# Patient Record
Sex: FEMALE | Race: WHITE | NOT HISPANIC OR LATINO | Employment: FULL TIME | URBAN - METROPOLITAN AREA
[De-identification: names, ages, dates, MRNs, and addresses within clinical notes are randomized per-mention and may not be internally consistent; named-entity substitution may affect disease eponyms.]

---

## 2018-02-01 ENCOUNTER — HOSPITAL ENCOUNTER (EMERGENCY)
Facility: HOSPITAL | Age: 63
Discharge: HOME | End: 2018-02-01
Payer: COMMERCIAL

## 2018-02-01 VITALS
RESPIRATION RATE: 20 BRPM | DIASTOLIC BLOOD PRESSURE: 86 MMHG | SYSTOLIC BLOOD PRESSURE: 129 MMHG | HEIGHT: 64 IN | BODY MASS INDEX: 28.17 KG/M2 | TEMPERATURE: 98 F | WEIGHT: 165 LBS | OXYGEN SATURATION: 96 % | HEART RATE: 99 BPM

## 2018-02-01 DIAGNOSIS — I83.891 BLEEDING FROM VARICOSE VEINS OF LOWER EXTREMITY, RIGHT: Primary | ICD-10-CM

## 2018-02-01 LAB
BASOPHILS # BLD AUTO: 0.03 K/UL
BASOPHILS NFR BLD: 0.4 %
DIFFERENTIAL METHOD: ABNORMAL
EOSINOPHIL # BLD AUTO: 0.1 K/UL
EOSINOPHIL NFR BLD: 1.7 %
ERYTHROCYTE [DISTWIDTH] IN BLOOD BY AUTOMATED COUNT: 13.6 %
HCT VFR BLD AUTO: 42 %
HGB BLD-MCNC: 13.3 G/DL
LYMPHOCYTES # BLD AUTO: 2.5 K/UL
LYMPHOCYTES NFR BLD: 32.4 %
MCH RBC QN AUTO: 29.2 PG
MCHC RBC AUTO-ENTMCNC: 31.7 G/DL
MCV RBC AUTO: 92 FL
MONOCYTES # BLD AUTO: 0.7 K/UL
MONOCYTES NFR BLD: 9.1 %
NEUTROPHILS # BLD AUTO: 4.3 K/UL
NEUTROPHILS NFR BLD: 56.1 %
PLATELET # BLD AUTO: 285 K/UL
PMV BLD AUTO: 10.7 FL
RBC # BLD AUTO: 4.56 M/UL
WBC # BLD AUTO: 7.57 K/UL

## 2018-02-01 PROCEDURE — 99283 EMERGENCY DEPT VISIT LOW MDM: CPT

## 2018-02-01 PROCEDURE — 85025 COMPLETE CBC W/AUTO DIFF WBC: CPT

## 2018-02-01 RX ORDER — ESOMEPRAZOLE MAGNESIUM 10 MG/1
10 GRANULE, FOR SUSPENSION, EXTENDED RELEASE ORAL
COMMUNITY
End: 2022-09-01

## 2018-02-01 NOTE — ED PROVIDER NOTES
"Encounter Date: 2/1/2018       History     Chief Complaint   Patient presents with    Laceration     RLE near calf - pt states "I shaved last night and I cut myself and it just started bleeding. I think I hit a vein." No active bleeding noted at this time. Dried blood noted to pts pressure dressing."     Patient is a 62-year-old female presents emergent department for bleeding of her varicose vein in her right lower extremity.  Patient reports that she no care varicose vein shaving last night and states that she had a moderate amount of bleeding that have.  Patient reports that she applied dressing and states that every time she would remove the dressing which started bleeding shortly afterwards.  Patient reports that she had no bleeding today, but states that she was walking around and arms in no and began bleeding again from the right lower extremity near the area worsening varicose vein.  Patient reports moderate amount of blood loss again today.  Patient reports no symptoms, no chest pain or shortness of breath or lightheadedness.  Patient is not diaphoretic.  Patient is warned about her blood counts, states she is normally sometimes anemic.  Patient has no active bleeding noted.          Review of patient's allergies indicates:  Allergies not on file  Past Medical History:   Diagnosis Date    Hydrocephalus      Past Surgical History:   Procedure Laterality Date    BRAIN SURGERY       No family history on file.  Social History   Substance Use Topics    Smoking status: Never Smoker    Smokeless tobacco: Not on file    Alcohol use No     Review of Systems   Constitutional: Negative for fever.   HENT: Negative for sore throat.    Respiratory: Negative for shortness of breath.    Cardiovascular: Negative for chest pain.   Gastrointestinal: Negative for nausea.   Genitourinary: Negative for dysuria.   Musculoskeletal: Negative for back pain.   Skin: Negative for rash.        See above.    Neurological: Negative " for weakness.   Hematological: Does not bruise/bleed easily.   All other systems reviewed and are negative.      Physical Exam     Initial Vitals [02/01/18 1538]   BP Pulse Resp Temp SpO2   129/86 110 18 98.2 °F (36.8 °C) 98 %      MAP       100.33         Physical Exam    Nursing note and vitals reviewed.  Constitutional: She appears well-developed and well-nourished.   HENT:   Head: Normocephalic and atraumatic.   Nose: Nose normal.   Eyes: Conjunctivae and EOM are normal. Pupils are equal, round, and reactive to light.   Neck: Normal range of motion.   Cardiovascular: Normal rate and regular rhythm.   Pulmonary/Chest: Breath sounds normal.   Musculoskeletal: Normal range of motion.   Patient does have mild varicose veins noted bilaterally lower extremities.  Mild 1+ nonpitting edema.   Neurological: She is alert and oriented to person, place, and time.   Skin: Skin is warm and dry.   Varicose veins noted in the bilateral lower extremity is in no active bleeding noted.   Psychiatric: She has a normal mood and affect. Her behavior is normal. Judgment and thought content normal.         ED Course   Procedures  Labs Reviewed - No data to display          Medical Decision Making:   Initial Assessment:   Patient is a 62-year-old female presents emergent department for bleeding of her varicose vein in her right lower extremity.  Patient reports that she no care varicose vein shaving last night and states that she had a moderate amount of bleeding that have.  Patient reports that she applied dressing and states that every time she would remove the dressing which started bleeding shortly afterwards.  Patient reports that she had no bleeding today, but states that she was walking around and arms in no and began bleeding again from the right lower extremity near the area worsening varicose vein.  Patient reports moderate amount of blood loss again today.  Patient reports no symptoms, no chest pain or shortness of breath or  lightheadedness.  Patient is not diaphoretic.  Patient is warned about her blood counts, states she is normally sometimes anemic.  Patient has no active bleeding noted.  Differential Diagnosis:   Bleeding varicose vein  ED Management:  Patient is a 62-year-old female presented to the emergency department for blood loss secondary to varicose vein bleeding.  Patient reports every time she removed the dressing from her varicose vein and would begin to bleed.  I do believe patient most likely pulling the clot away.  Discussed with her to apply antibiotic ointment as a barrier as well as use a nonstick dressing and used is an wrap whenever she gets bleeding.  Ace is no active bleeding noted today, no site that can be cauterized.  Discussed with patient that she needs to wear compression hose as well as a nonstick pressure dressing for the next several days and follow-up with her primary care physician for further evaluation.  We'll check a CBC today because patient is reporting 2 bouts of moderate blood loss.    Patient's CBC is within normal limits, she will be discharged home.  She still has no active bleeding noted.                   ED Course      Clinical Impression:   The encounter diagnosis was Bleeding from varicose veins of lower extremity, right.    Disposition:   Disposition: Discharged  Condition: Stable                        Alan Mahoney PA-C  02/01/18 1638       Zia Tripathi MD  02/06/18 3061

## 2018-02-01 NOTE — ED TRIAGE NOTES
"RLE near calf - pt states "I shaved last night and I cut myself and it just started bleeding. I think I hit a vein." No active bleeding noted at this time. Dried blood noted to pts pressure dressing."  "

## 2018-07-10 DIAGNOSIS — Z12.31 VISIT FOR SCREENING MAMMOGRAM: Primary | ICD-10-CM

## 2018-07-13 ENCOUNTER — HOSPITAL ENCOUNTER (OUTPATIENT)
Dept: RADIOLOGY | Facility: HOSPITAL | Age: 63
Discharge: HOME | End: 2018-07-13
Attending: FAMILY MEDICINE
Payer: COMMERCIAL

## 2018-07-13 DIAGNOSIS — Z12.31 VISIT FOR SCREENING MAMMOGRAM: ICD-10-CM

## 2018-07-13 PROCEDURE — 77067 SCR MAMMO BI INCL CAD: CPT | Mod: TC,PO

## 2018-07-18 ENCOUNTER — TELEPHONE (OUTPATIENT)
Dept: RADIOLOGY | Facility: HOSPITAL | Age: 63
End: 2018-07-18

## 2018-07-25 ENCOUNTER — HOSPITAL ENCOUNTER (OUTPATIENT)
Dept: RADIOLOGY | Facility: HOSPITAL | Age: 63
Discharge: HOME | End: 2018-07-25
Attending: FAMILY MEDICINE
Payer: COMMERCIAL

## 2018-07-25 DIAGNOSIS — R92.8 ABNORMAL MAMMOGRAM: ICD-10-CM

## 2018-07-25 PROCEDURE — 77061 BREAST TOMOSYNTHESIS UNI: CPT | Mod: TC,PO

## 2018-07-25 PROCEDURE — 77065 DX MAMMO INCL CAD UNI: CPT | Mod: TC,PO

## 2018-07-25 PROCEDURE — 76642 ULTRASOUND BREAST LIMITED: CPT | Mod: TC,PO,RT

## 2018-07-30 ENCOUNTER — HOSPITAL ENCOUNTER (OUTPATIENT)
Dept: RADIOLOGY | Facility: HOSPITAL | Age: 63
Discharge: HOME | End: 2018-07-30
Attending: FAMILY MEDICINE
Payer: COMMERCIAL

## 2018-07-30 DIAGNOSIS — R92.8 ABNORMAL FINDING ON BREAST IMAGING: ICD-10-CM

## 2018-07-30 PROCEDURE — 77065 DX MAMMO INCL CAD UNI: CPT | Mod: TC,RT

## 2018-07-30 PROCEDURE — 19083 BX BREAST 1ST LESION US IMAG: CPT | Mod: ,,, | Performed by: RADIOLOGY

## 2018-07-30 PROCEDURE — 27201044 US BREAST BIOPSY WITH IMAGING 1ST SITE RIGHT

## 2018-07-30 PROCEDURE — 88360 TUMOR IMMUNOHISTOCHEM/MANUAL: CPT | Performed by: PATHOLOGY

## 2018-07-30 PROCEDURE — 77065 DX MAMMO INCL CAD UNI: CPT | Mod: 26,RT,, | Performed by: RADIOLOGY

## 2018-07-30 PROCEDURE — 88360 TUMOR IMMUNOHISTOCHEM/MANUAL: CPT | Mod: 26,,, | Performed by: PATHOLOGY

## 2018-07-30 PROCEDURE — 19083 BX BREAST 1ST LESION US IMAG: CPT

## 2018-07-30 PROCEDURE — 88305 TISSUE EXAM BY PATHOLOGIST: CPT | Mod: 26,,, | Performed by: PATHOLOGY

## 2018-08-03 ENCOUNTER — TELEPHONE (OUTPATIENT)
Dept: SURGERY | Facility: CLINIC | Age: 63
End: 2018-08-03

## 2018-08-03 NOTE — TELEPHONE ENCOUNTER
"Patient called me and discussed her recent diagnosis. She would like to schedule with Dr. Eden in his [place] clinic for 8/17. Offered sooner but she states that her school is starting and she would like to wait "until I get things settled"     Scheduled for 8/17 at 2:30 per patient request. Reviewed location of the [place] clinic. Patient verbalized understanding to all information   "

## 2018-08-03 NOTE — TELEPHONE ENCOUNTER
----- Message from RT Nona sent at 8/3/2018  4:01 PM CDT -----  Pt was given her positive breast biopsy results. She definitely wants to stay in the [facility] system. She will discuss her campus preference with her . She was made aware the 8/17 Dr. Eden will be in clinic in [place]. She has your phone number and will call you or if you have the opportunity to call her at your convenience that is fine also.

## 2018-08-17 ENCOUNTER — OFFICE VISIT (OUTPATIENT)
Dept: SURGERY | Facility: CLINIC | Age: 63
End: 2018-08-17
Payer: COMMERCIAL

## 2018-08-17 VITALS
HEART RATE: 56 BPM | SYSTOLIC BLOOD PRESSURE: 110 MMHG | BODY MASS INDEX: 26.98 KG/M2 | DIASTOLIC BLOOD PRESSURE: 83 MMHG | WEIGHT: 158 LBS | RESPIRATION RATE: 20 BRPM | HEIGHT: 64 IN

## 2018-08-17 DIAGNOSIS — C50.211 PRIMARY CANCER OF UPPER INNER QUADRANT OF RIGHT FEMALE BREAST: Primary | ICD-10-CM

## 2018-08-17 PROCEDURE — 99245 OFF/OP CONSLTJ NEW/EST HI 55: CPT | Mod: S$GLB,,, | Performed by: SURGERY

## 2018-08-17 PROCEDURE — 99999 PR PBB SHADOW E&M-EST. PATIENT-LVL III: CPT | Mod: PBBFAC,,, | Performed by: SURGERY

## 2018-08-17 NOTE — LETTER
August 18, 2018      Evens Moreno III, MD  [address] [place] [place] [address]           [facility]  [address]  [facility] [address]  Phone: [phone]  Fax: [phone]          Patient: Ira Novoa   MR Number: [id number]   YOB: 1955   Date of Visit: 8/17/2018       Dear Dr. Evens Moreno III:    Thank you for referring Ira Novoa to me for evaluation. Attached you will find relevant portions of my assessment and plan of care.    If you have questions, please do not hesitate to call me. I look forward to following Ira Novoa along with you.    Sincerely,    Edgar Eden MD    Enclosure  CC:  No Recipients    If you would like to receive this communication electronically, please contact [email or web address] or [phone] to request more information on [employer] Link access.    For providers and/or their staff who would like to refer a patient to [facility], please contact us through our one-stop-shop provider referral line, [facility], at [phone].    If you feel you have received this communication in error or would no longer like to receive these types of communications, please e-mail [email or web address]

## 2018-08-17 NOTE — PROGRESS NOTES
New Breast Cancer  History and Physical  [facility]  Department of Surgery    REFERRING PROVIDER: Evens Moreno III, MD  [address] [place] [address]    CHIEF COMPLAINT: right breast cancer    Subjective:      Ira Novoa is a 63 y.o. postmenopausal female referred for evaluation of recently diagnosed carcinoma of the right breast. The patient was initially referred for surgical evaluation of an abnormal mammogram first noted 2018. Follow-up imaging showed an irregularly shaped, non-parallel, hypoechoic mass with indistinct margins with shadowing seen in the upper inner quadrant of the right breast with a 5 mm oval shaped hypoechoic mass adjacent to the shadowing mass and a 6 mm oval shaped hypoechoic mass 1.3 cm from the shadowing mass.   Patient was initially evaluated for left breast pain. Reports severe constant left breast pain which causes disruption of sleep and is not relieved by pain medications. This pain prompted her to obtain a mammogram which is when the right breast mass was noted.     Patient does routinely do self breast exams.  Patient has not noted a change on breast exam.  Patient denies nipple discharge. Patient denies to previous breast biopsy. Patient denies a personal history of breast cancer.    Patient is a nonsmoker. No diabetes. Patient has history of hydrocephalus for which a  shunt was placed at age 6 weeks and has had 7 revisions since. Shunt runs from neck around left shoulder and down midline.    Findings at that time were the following:   Tumor size: 0.8 cm   Tumor ndgndrndanddndend:nd nd2nd Estrogen Receptor: +   Progesterone Receptor: -   Her-2 sri: insufficient   Lymph node status: clinically negative       GYN History:  Age of menarche was 11. Age of menopause was 56.  Patient denies hormonal therapy. Patient is . Age of first live birth was 29.     FAMILY History:  No breast or ovarian cancer    Past Medical History:   Diagnosis Date    Hydrocephalus   "    Past Surgical History:   Procedure Laterality Date    BRAIN SURGERY       Current Outpatient Medications on File Prior to Visit   Medication Sig Dispense Refill    esomeprazole magnesium (NEXIUM) 10 mg GrPS Take 10 mg by mouth before breakfast.       No current facility-administered medications on file prior to visit.      Social History     Socioeconomic History    Marital status:      Spouse name: Not on file    Number of children: Not on file    Years of education: Not on file    Highest education level: Not on file   Social Needs    Financial resource strain: Not on file    Food insecurity - worry: Not on file    Food insecurity - inability: Not on file    Transportation needs - medical: Not on file    Transportation needs - non-medical: Not on file   Occupational History    Not on file   Tobacco Use    Smoking status: Never Smoker   Substance and Sexual Activity    Alcohol use: No    Drug use: Not on file    Sexual activity: Not on file   Other Topics Concern    Not on file   Social History Narrative    Not on file     History reviewed. No pertinent family history.     Review of Systems  Review of Systems   Constitutional: Negative for chills and fever.   HENT: Negative for congestion and sore throat.    Respiratory: Negative for cough and shortness of breath.    Cardiovascular: Negative for chest pain and palpitations.   Gastrointestinal: Negative for abdominal distention and abdominal pain.   Musculoskeletal: Negative for arthralgias and myalgias.   Neurological: Negative for dizziness and headaches.   Psychiatric/Behavioral: Negative for agitation and confusion.        Objective:   PHYSICAL EXAM:  /83 (BP Location: Right arm, Patient Position: Sitting)   Pulse (!) 56   Resp 20   Ht 5' 4" (1.626 m)   Wt 71.7 kg (158 lb)   BMI 27.12 kg/m²     Physical Exam   Constitutional: She is oriented to person, place, and time. She appears well-developed and well-nourished. No " distress.   Cardiovascular: Normal rate and regular rhythm.    Pulmonary/Chest: Effort normal. Right breast exhibits mass. Right breast exhibits no inverted nipple, no nipple discharge, no skin change and no tenderness. Left breast exhibits tenderness (Generalized left breast tenderness and axillary tenderness). Left breast exhibits no inverted nipple, no mass, no nipple discharge and no skin change.       Neurological: She is alert and oriented to person, place, and time.   Skin: Skin is warm and dry.     Psychiatric: She has a normal mood and affect. Her behavior is normal.         Assessment:      Ira Novoa is a 63 y.o. postmenopausal female with recently diagnosed carcinoma of the right breast.      Plan:     Discussed surgical options with patient and her . Since right breast mass has two other hypoechoic areas 1.3cm from original mass which could be cysts or possible satellite lesions, a lumpectomy would likely leave her right breast with some minimal deformity. Patient would like right sided mastectomy as well as left prophylactic mastectomy due to her significant breast pain.   We discussed reconstruction options, both implant and tissue. Since she has many scars on her abdomen from prior shunt revisions, implant reconstruction would likely be a more viable option and give her a more cosmetic result.   Plan for her to meet with Dr. Wilde to discuss reconstruction options then will set up for bilateral mastectomy with right SLNB.    Magaly Rizo MD, PGY-3  General Surgery  [phone]    I have personally taken the history and examined this patient and agree with the resident's note as stated above.  I have personally taken the history and examined this patient and agree with the resident's note as stated above.  SUBJECTIVE:  Ira Novoa is a very pleasant 63-year-old  female   who presents with her , Dany, here at the [facility] with a   newly diagnosed right  invasive ductal carcinoma.  The patient had presented with   left breast pain, which had been increasing in severity and intensity.    Diagnostic imaging and workup revealed a contralateral right-sided invasive   ductal carcinoma that was estrogen receptor positive, progesterone receptor   negative, and HER-2/sri insufficient for diagnosis.  The patient originally had   an 8 mm mass in the right breast and two possibly adjacent hypoechoic lesions,   1.3 cm from it, which were either cysts or satellite lesions.    There is an irregularly shaped mass with spiculated margins seen in the upper inner quadrant of the right breast in the posterior depth.  There are pleomorphic calcifications associated with this mass.      US Breast Right Limited  There is an irregularly shaped, non-parallel, hypoechoic mass with indistinct margins with shadowing seen in the upper inner quadrant of the right breast. The mass correlates with a prior mammogram finding.  There is a 5 mm oval shaped hypoechoic mass adjacent to the shadowing mass.  There is a 6 mm oval shaped hypoechoic mass 1.3 cm from the shadowing mass.    The patient is comfortable with either breast conservation surgery or   mastectomy, but would prefer bilateral mastectomy with possible nipple-sparing   mastectomy if possible.  She would prefer implants.  Her significant history is   that she has a congenital  shunt, which has been in place for hydrocephalus   since she was 6 weeks of age.   shunt comes around the left side of her neck   and descends along the midline anterior chest wall into the abdominal cavity.  I   can feel the  shunt along the left neck and she states it descends down the   midline between the two breasts, which I cannot necessarily feel.  She has no   immunosuppressive issue.  She is not a smoker and she is not a diabetic.  She   does desire the bilateral mastectomies.  We will have her see Dr. Wilde for   consultation over at [facility] to  discuss implant technique reconstruction   with either direct implant versus a tissue expansion and subsequent implant   exchange.  At this point, she will be scheduled for a right nipple-sparing   mastectomy with right axillary staging sentinel lymph node biopsy and   contralateral left nipple-sparing mastectomy for prophylaxis.  She appears to be   a good candidate for a nipple-sparing mastectomy with C cup size breast with no   significant ptosis.  She is unable to get an MRI because of the  shunt and we   will await consultation with Dr. Wilde.  Again, from my standpoint, she will   have a right potential nipple sparing mastectomy with right axillary sentinel   lymph node biopsy with contralateral left prophylactic nipple-sparing mastectomy   with immediate implant technique type of reconstruction.      RLC/HN  dd: 08/17/2018 16:16:38 (CDT)  td: 08/17/2018 20:04:27 (CDT)  Doc ID   #[id number]  Job ID #[id number]    CC:     Job #

## 2018-08-17 NOTE — LETTER
[facility]  [address]  [place] [address]  Phone: [phone]  Fax: [phone] August 18, 2018      Evens Moreno III, MD  [address] [place] [address]    Patient: Ira Novoa   MR Number: [id number]   YOB: 1955   Date of Visit: 8/17/2018     Dear Dr. Moreno:    Thank you for referring Ira Novoa to me for evaluation. Attached you will find relevant portions of my assessment and plan of care.    Ira Novoa is a very pleasant 63-year-old  female who presents with her , Dany, here at the [facility] with a newly diagnosed right invasive ductal carcinoma.  The patient had presented with left breast pain, which had been increasing in severity and intensity.  Diagnostic imaging and workup revealed a contralateral right-sided invasive ductal carcinoma that was estrogen receptor positive, progesterone receptor negative, and HER-2/sri insufficient for diagnosis.  The patient originally had an 8 mm mass in the right breast and two possibly adjacent hypoechoic lesions, 1.3 cm from it, which were either cysts or satellite lesions.    There is an irregularly shaped mass with spiculated margins seen in the upper inner quadrant of the right breast in the posterior depth. There are pleomorphic calcifications associated with this mass.      US Breast Right Limited.  There is an irregularly shaped, non-parallel, hypoechoic mass with indistinct margins with shadowing seen in the upper inner quadrant of the right breast. The mass correlates with a prior mammogram finding.  There is a 5 mm oval shaped hypoechoic mass adjacent to the shadowing mass.  There is a 6 mm oval shaped hypoechoic mass 1.3 cm from the shadowing mass.    The patient is comfortable with either breast conservation surgery or mastectomy, but would prefer bilateral mastectomy with possible nipple-sparing mastectomy if possible.  She would prefer implants.   Her significant history is that she has a congenital  shunt, which has been in place for hydrocephalus since she was 6 weeks of age.   shunt comes around the left side of her neck and descends along the midline anterior chest wall into the abdominal cavity.  I can feel the  shunt along the left neck and she states it descends down the midline between the two breasts, which I cannot necessarily feel.  She has no immunosuppressive issue.  She is not a smoker and she is not a diabetic.  She does desire the bilateral mastectomies.  We will have her see Dr. Wilde for consultation over at [facility] to discuss implant technique reconstruction with either direct implant versus a tissue expansion and subsequent implant exchange.      At this point, she will be scheduled for a right nipple-sparing mastectomy with right axillary staging sentinel lymph node biopsy and contralateral left nipple-sparing mastectomy for prophylaxis.  She appears to be a good candidate for a nipple-sparing mastectomy with C cup size breast with no significant ptosis.  She is unable to get an MRI because of the  shunt and we will await consultation with Dr. Wilde.  Again, from my standpoint, she will have a right potential nipple sparing mastectomy with right axillary sentinel lymph node biopsy with contralateral left prophylactic nipple-sparing mastectomy with immediate implant technique type of reconstruction.    If you have questions, please do not hesitate to call me. I look forward to following Ira Novoa along with you.    Sincerely,    Edgar Eden MD  Medical Director, [facility]  Staff Attending Surgeon - Department of Surgery  [facility]  Associate Professor of Surgery  [employer]  [facility] [employer]    RLC/hcr

## 2018-08-18 PROBLEM — C50.211: Status: ACTIVE | Noted: 2018-08-18

## 2018-08-20 ENCOUNTER — TELEPHONE (OUTPATIENT)
Dept: PLASTIC SURGERY | Facility: CLINIC | Age: 63
End: 2018-08-20

## 2018-08-20 NOTE — TELEPHONE ENCOUNTER
Called regarding portal message request for consult appt, CITLALLI with office call back info. RITA, LPN

## 2018-08-22 ENCOUNTER — OFFICE VISIT (OUTPATIENT)
Dept: PLASTIC SURGERY | Facility: CLINIC | Age: 63
End: 2018-08-22
Payer: COMMERCIAL

## 2018-08-22 VITALS
RESPIRATION RATE: 12 BRPM | DIASTOLIC BLOOD PRESSURE: 82 MMHG | WEIGHT: 157.75 LBS | HEART RATE: 58 BPM | BODY MASS INDEX: 26.93 KG/M2 | HEIGHT: 64 IN | SYSTOLIC BLOOD PRESSURE: 129 MMHG | TEMPERATURE: 98 F

## 2018-08-22 DIAGNOSIS — C50.211 PRIMARY CANCER OF UPPER INNER QUADRANT OF RIGHT FEMALE BREAST: Primary | ICD-10-CM

## 2018-08-22 PROCEDURE — 99203 OFFICE O/P NEW LOW 30 MIN: CPT | Mod: S$GLB,,, | Performed by: SURGERY

## 2018-08-22 PROCEDURE — 99999 PR PBB SHADOW E&M-EST. PATIENT-LVL III: CPT | Mod: PBBFAC,,, | Performed by: SURGERY

## 2018-08-22 PROCEDURE — 3008F BODY MASS INDEX DOCD: CPT | Mod: CPTII,S$GLB,, | Performed by: SURGERY

## 2018-08-22 NOTE — LETTER
August 22, 2018      Evens Moreno III, MD  [address] [place] [address]           [facility]  [address]  [facility] [address]  Phone: [phone]  Fax: [phone]          Patient: Ira Novoa   MR Number: [id number]   YOB: 1955   Date of Visit: 8/22/2018       Dear Dr. Evens Moreno III:    Thank you for referring Ira Novoa to me for evaluation. Attached you will find relevant portions of my assessment and plan of care.    If you have questions, please do not hesitate to call me. I look forward to following Ira Novoa along with you.    Sincerely,    Donnie Wilde MD    Enclosure  CC:  No Recipients    If you would like to receive this communication electronically, please contact [email or web address] or [phone] to request more information on [employer] Link access.    For providers and/or their staff who would like to refer a patient to [facility], please contact us through our one-stop-shop provider referral line, [facility], at [phone].    If you feel you have received this communication in error or would no longer like to receive these types of communications, please e-mail [email or web address]

## 2018-08-22 NOTE — LETTER
August 30, 2018      Evens Moreno III, MD  [address] [place] [address]     [facility]  [address]  [facility] [address]  Phone: [phone]  Fax: [phone]   Patient: Ira Novoa   MR Number: [id number]   YOB: 1955   Date of Visit: 8/22/2018     Dear Dr. Moreno:    Thank you for referring Ira Novoa to me for evaluation. Below are the relevant portions of my assessment and plan of care.    ASSESSMENT/PLAN:      Patient is a 63-year-old femal with new DX of R invasive ductal cancer with ER+SC-, HER2-, and chronic left breast pain. Planned for bilateral nipple sparing mastectomies  -Plan for direct to implant reconstruction  -Discussed risks and alternative options, including TE, no reconstruction, autologous tissue, patient desires implant based reconstruction.   -Also discussed possibility of nipple necrosis, infection, bleeding, need for future surgeries    If you have questions, please do not hesitate to call me. I look forward to following Ira along with you.    Sincerely,      MD Donnie Carroll M.D.  Section of Plastic Surgery  [facility]  YANDEL/shari

## 2018-08-22 NOTE — PROGRESS NOTES
Juan Pablo Rosario - Plastic Surg [facility]  History & Physical  Plastic Surgery    SUBJECTIVE:     Chief Complaint: breast cancer reconstruction    History of Present Illness:  Patient is a 63 y.o. female with a new diagnosis of R breast cancer at R upper inner outer quadrant, invasive ductal cancer, found on workup of left breast pain. Bilateral nipple sparing mastectomies are planned, with R SLN biopsy. She reports being in otherwise good health, no smoking or DM. She has a hx of congenital hydrocephalus and has a  shunt in place.       (Not in a hospital admission)    Review of patient's allergies indicates:  No Known Allergies    Past Medical History:   Diagnosis Date    Hydrocephalus      Past Surgical History:   Procedure Laterality Date    BRAIN SURGERY       No family history on file.  Social History     Tobacco Use    Smoking status: Never Smoker   Substance Use Topics    Alcohol use: No    Drug use: Not on file        Review of Systems:  Constitutional: no fever or chills  Cardiovascular: no chest pain or palpitations  Gastrointestinal: no nausea or vomiting, tolerating diet  Integument/Breast: no rash or pruritis  Musculoskeletal: no arthralgias or myalgias  Neurological: no seizures or tremors  Behavioral/Psych: no auditory or visual hallucinations    OBJECTIVE:     Vital Signs (Most Recent):  Temp: 98.1 °F (36.7 °C) (08/22/18 0829)  Pulse: (!) 58 (08/22/18 0829)  Resp: 12 (08/22/18 0829)  BP: 129/82 (08/22/18 0829)    Physical Exam:  General: no distress  Eyes:  conjunctivae/corneas clear.  Lungs:  clear to auscultation bilaterally and normal respiratory effort  Chest Wall: well healed scars from  shunt  Abdomen: soft, non-tender non-distented; bowel sounds normal; no masses,  no organomegaly and well healed scars from  shunt  Extremities: no cyanosis or edema, or clubbing  Skin: Skin color, texture, turgor normal. No rashes or lesions  Neurologic: Alert and oriented. Thought content  appropriate      ASSESSMENT/PLAN:     63F w/ new dx of R invasive ductal cancer with ER+UT-, HER2-, and chronic left breast pain. Planned for bilateral nipple sparing mastectomies  -plan for direct to implant reconstruction  -discussed risks and alternative options, including TE, no reconstruction, autologous tissue, patient desires implant based reconstruction.   -also discussed possibility of nipple necrosis, infection, bleeding, need for future surgeries.

## 2018-08-27 ENCOUNTER — TELEPHONE (OUTPATIENT)
Dept: PLASTIC SURGERY | Facility: CLINIC | Age: 63
End: 2018-08-27

## 2018-08-27 NOTE — TELEPHONE ENCOUNTER
Pt called back in reference to earlier question about surgery date. Reinforced that surgery may need another provider to consult with for procedure and office will reach out as soon as dates become available. RITA, BOST[place]

## 2018-08-27 NOTE — TELEPHONE ENCOUNTER
LVM on unid'd VM in regards to call center question about surgery date. Appears that surgery may in combination with another surgeon, left office contact info on VM. BOSTON SALINAS    ----- Message from Rufino Lawrence sent at 8/27/2018 11:11 AM CDT -----  Pt would like to speak with someone regarding the scheduling of her surgery. Please call pt at [phone]

## 2018-08-29 DIAGNOSIS — C50.211 PRIMARY CANCER OF UPPER INNER QUADRANT OF RIGHT FEMALE BREAST: Primary | ICD-10-CM

## 2018-09-04 DIAGNOSIS — C50.211 PRIMARY CANCER OF UPPER INNER QUADRANT OF RIGHT FEMALE BREAST: Primary | ICD-10-CM

## 2018-09-13 ENCOUNTER — TELEPHONE (OUTPATIENT)
Dept: PLASTIC SURGERY | Facility: CLINIC | Age: 63
End: 2018-09-13

## 2018-09-13 NOTE — TELEPHONE ENCOUNTER
Pre Op 9:55 a 9..13.18    Ms Novoa was pre-op'd for TE.  I verified her surgery date and to report to 2nd floor [facility].  I educated her on preparations prior to, during at post operatively.  I explained hygiene, use and function of the ELENI drain as well as when to expect a call regarding arrival time.      She verbalized understanding and will call office should any questions or concerns arise. Ms Novoa had brain   surgery years ago which resulted in blood clotting of her lungs. I promised that that would be documented.

## 2018-09-20 ENCOUNTER — ANESTHESIA EVENT (OUTPATIENT)
Dept: SURGERY | Facility: HOSPITAL | Age: 63
End: 2018-09-20
Payer: COMMERCIAL

## 2018-09-21 ENCOUNTER — TELEPHONE (OUTPATIENT)
Dept: SURGERY | Facility: CLINIC | Age: 63
End: 2018-09-21

## 2018-09-21 NOTE — TELEPHONE ENCOUNTER
----- Message from Rufino Lawrence sent at 9/21/2018 12:36 PM CDT -----  Pt is calling in regards to her surgery time for Monday. Please call pt at [phone]

## 2018-09-21 NOTE — TELEPHONE ENCOUNTER
Instructed to shower twice with antibacterial soap (pm and am), no food after midnight but okay to have clear liquids 11am.

## 2018-09-24 ENCOUNTER — ANESTHESIA (OUTPATIENT)
Dept: SURGERY | Facility: HOSPITAL | Age: 63
End: 2018-09-24
Payer: COMMERCIAL

## 2018-09-24 ENCOUNTER — HOSPITAL ENCOUNTER (OUTPATIENT)
Dept: RADIOLOGY | Facility: HOSPITAL | Age: 63
Discharge: HOME | End: 2018-09-24
Attending: SURGERY | Admitting: SURGERY
Payer: COMMERCIAL

## 2018-09-24 ENCOUNTER — HOSPITAL ENCOUNTER (OUTPATIENT)
Facility: HOSPITAL | Age: 63
Discharge: HOME | End: 2018-09-25
Attending: SURGERY | Admitting: SURGERY
Payer: COMMERCIAL

## 2018-09-24 DIAGNOSIS — C50.919 BREAST CANCER: Primary | ICD-10-CM

## 2018-09-24 DIAGNOSIS — C50.211 PRIMARY CANCER OF UPPER INNER QUADRANT OF RIGHT FEMALE BREAST: ICD-10-CM

## 2018-09-24 PROCEDURE — A9520 TC99 TILMANOCEPT DIAG 0.5MCI: HCPCS

## 2018-09-24 PROCEDURE — 71000033 HC RECOVERY, INTIAL HOUR: Performed by: SURGERY

## 2018-09-24 PROCEDURE — 15777 ACELLULAR DERM MATRIX IMPLT: CPT | Mod: 50,,, | Performed by: SURGERY

## 2018-09-24 PROCEDURE — S0028 INJECTION, FAMOTIDINE, 20 MG: HCPCS | Performed by: NURSE ANESTHETIST, CERTIFIED REGISTERED

## 2018-09-24 PROCEDURE — 36000706: Performed by: SURGERY

## 2018-09-24 PROCEDURE — 64520 N BLOCK LUMBAR/THORACIC: CPT | Performed by: ANESTHESIOLOGY

## 2018-09-24 PROCEDURE — D9220A PRA ANESTHESIA: Mod: CRNA,,, | Performed by: NURSE ANESTHETIST, CERTIFIED REGISTERED

## 2018-09-24 PROCEDURE — 88305 TISSUE EXAM BY PATHOLOGIST: CPT | Performed by: PATHOLOGY

## 2018-09-24 PROCEDURE — 37000009 HC ANESTHESIA EA ADD 15 MINS: Performed by: SURGERY

## 2018-09-24 PROCEDURE — D9220A PRA ANESTHESIA: Mod: ANES,,, | Performed by: ANESTHESIOLOGY

## 2018-09-24 PROCEDURE — 25000003 PHARM REV CODE 250: Performed by: SURGERY

## 2018-09-24 PROCEDURE — 88331 PATH CONSLTJ SURG 1 BLK 1SPC: CPT | Mod: 26,,, | Performed by: PATHOLOGY

## 2018-09-24 PROCEDURE — 63600175 PHARM REV CODE 636 W HCPCS: Performed by: NURSE ANESTHETIST, CERTIFIED REGISTERED

## 2018-09-24 PROCEDURE — C1729 CATH, DRAINAGE: HCPCS | Performed by: SURGERY

## 2018-09-24 PROCEDURE — 63600175 PHARM REV CODE 636 W HCPCS: Performed by: SURGERY

## 2018-09-24 PROCEDURE — 88360 TUMOR IMMUNOHISTOCHEM/MANUAL: CPT | Mod: 26,,, | Performed by: PATHOLOGY

## 2018-09-24 PROCEDURE — 63600175 PHARM REV CODE 636 W HCPCS: Performed by: ANESTHESIOLOGY

## 2018-09-24 PROCEDURE — 36000707: Performed by: SURGERY

## 2018-09-24 PROCEDURE — 27201423 OPTIME MED/SURG SUP & DEVICES STERILE SUPPLY: Performed by: SURGERY

## 2018-09-24 PROCEDURE — 88341 IMHCHEM/IMCYTCHM EA ADD ANTB: CPT | Mod: 26,59,, | Performed by: PATHOLOGY

## 2018-09-24 PROCEDURE — 25000003 PHARM REV CODE 250: Performed by: NURSE ANESTHETIST, CERTIFIED REGISTERED

## 2018-09-24 PROCEDURE — 38900 IO MAP OF SENT LYMPH NODE: CPT | Mod: RT,,, | Performed by: SURGERY

## 2018-09-24 PROCEDURE — 71000039 HC RECOVERY, EACH ADD'L HOUR: Performed by: SURGERY

## 2018-09-24 PROCEDURE — 25000003 PHARM REV CODE 250: Performed by: STUDENT IN AN ORGANIZED HEALTH CARE EDUCATION/TRAINING PROGRAM

## 2018-09-24 PROCEDURE — C1789 PROSTHESIS, BREAST, IMP: HCPCS | Performed by: SURGERY

## 2018-09-24 PROCEDURE — 37000008 HC ANESTHESIA 1ST 15 MINUTES: Performed by: SURGERY

## 2018-09-24 PROCEDURE — 38525 BIOPSY/REMOVAL LYMPH NODES: CPT | Mod: 51,RT,, | Performed by: SURGERY

## 2018-09-24 PROCEDURE — 88342 IMHCHEM/IMCYTCHM 1ST ANTB: CPT | Mod: 26,59,, | Performed by: PATHOLOGY

## 2018-09-24 PROCEDURE — 12000002 HC ACUTE/MED SURGE SEMI-PRIVATE ROOM

## 2018-09-24 PROCEDURE — 19340 INSJ BREAST IMPLT SM D MAST: CPT | Mod: 50,,, | Performed by: SURGERY

## 2018-09-24 PROCEDURE — 64461 PVB THORACIC SINGLE INJ SITE: CPT | Mod: 50,59,, | Performed by: ANESTHESIOLOGY

## 2018-09-24 PROCEDURE — 88307 TISSUE EXAM BY PATHOLOGIST: CPT | Mod: 26,,, | Performed by: PATHOLOGY

## 2018-09-24 PROCEDURE — 63600175 PHARM REV CODE 636 W HCPCS: Mod: JG | Performed by: SURGERY

## 2018-09-24 PROCEDURE — S0077 INJECTION, CLINDAMYCIN PHOSP: HCPCS | Performed by: NURSE ANESTHETIST, CERTIFIED REGISTERED

## 2018-09-24 PROCEDURE — 19303 MAST SIMPLE COMPLETE: CPT | Mod: 50,,, | Performed by: SURGERY

## 2018-09-24 DEVICE — IMPLANTABLE DEVICE: Type: IMPLANTABLE DEVICE | Site: BREAST | Status: FUNCTIONAL

## 2018-09-24 RX ORDER — ROCURONIUM BROMIDE 10 MG/ML
INJECTION, SOLUTION INTRAVENOUS
Status: DISCONTINUED | OUTPATIENT
Start: 2018-09-24 | End: 2018-09-24

## 2018-09-24 RX ORDER — GLYCOPYRROLATE 0.2 MG/ML
INJECTION INTRAMUSCULAR; INTRAVENOUS
Status: DISCONTINUED | OUTPATIENT
Start: 2018-09-24 | End: 2018-09-24

## 2018-09-24 RX ORDER — HYDROMORPHONE HYDROCHLORIDE 1 MG/ML
0.5 INJECTION, SOLUTION INTRAMUSCULAR; INTRAVENOUS; SUBCUTANEOUS EVERY 4 HOURS PRN
Status: DISCONTINUED | OUTPATIENT
Start: 2018-09-24 | End: 2018-09-25 | Stop reason: HOSPADM

## 2018-09-24 RX ORDER — LIDOCAINE HCL/PF 100 MG/5ML
SYRINGE (ML) INTRAVENOUS
Status: DISCONTINUED | OUTPATIENT
Start: 2018-09-24 | End: 2018-09-24

## 2018-09-24 RX ORDER — PROPOFOL 10 MG/ML
VIAL (ML) INTRAVENOUS
Status: DISCONTINUED | OUTPATIENT
Start: 2018-09-24 | End: 2018-09-24

## 2018-09-24 RX ORDER — SODIUM CHLORIDE 0.9 % (FLUSH) 0.9 %
3 SYRINGE (ML) INJECTION
Status: DISCONTINUED | OUTPATIENT
Start: 2018-09-24 | End: 2018-09-24 | Stop reason: HOSPADM

## 2018-09-24 RX ORDER — ISOSULFAN BLUE 50 MG/5ML
INJECTION, SOLUTION SUBCUTANEOUS
Status: DISCONTINUED | OUTPATIENT
Start: 2018-09-24 | End: 2018-09-24 | Stop reason: HOSPADM

## 2018-09-24 RX ORDER — CYCLOBENZAPRINE HCL 10 MG
10 TABLET ORAL 3 TIMES DAILY
Status: DISCONTINUED | OUTPATIENT
Start: 2018-09-24 | End: 2018-09-25 | Stop reason: HOSPADM

## 2018-09-24 RX ORDER — OXYCODONE AND ACETAMINOPHEN 5; 325 MG/1; MG/1
1 TABLET ORAL EVERY 4 HOURS PRN
Status: DISCONTINUED | OUTPATIENT
Start: 2018-09-24 | End: 2018-09-25 | Stop reason: HOSPADM

## 2018-09-24 RX ORDER — SODIUM CHLORIDE 9 MG/ML
INJECTION, SOLUTION INTRAVENOUS CONTINUOUS
Status: DISCONTINUED | OUTPATIENT
Start: 2018-09-24 | End: 2018-09-24

## 2018-09-24 RX ORDER — ONDANSETRON 2 MG/ML
INJECTION INTRAMUSCULAR; INTRAVENOUS
Status: DISCONTINUED | OUTPATIENT
Start: 2018-09-24 | End: 2018-09-24

## 2018-09-24 RX ORDER — NEOSTIGMINE METHYLSULFATE 1 MG/ML
INJECTION, SOLUTION INTRAVENOUS
Status: DISCONTINUED | OUTPATIENT
Start: 2018-09-24 | End: 2018-09-24

## 2018-09-24 RX ORDER — CLINDAMYCIN PHOSPHATE 900 MG/50ML
INJECTION, SOLUTION INTRAVENOUS
Status: DISCONTINUED | OUTPATIENT
Start: 2018-09-24 | End: 2018-09-24

## 2018-09-24 RX ORDER — MIDAZOLAM HYDROCHLORIDE 1 MG/ML
0.5 INJECTION INTRAMUSCULAR; INTRAVENOUS
Status: DISCONTINUED | OUTPATIENT
Start: 2018-09-24 | End: 2018-09-24

## 2018-09-24 RX ORDER — PANTOPRAZOLE SODIUM 40 MG/1
40 TABLET, DELAYED RELEASE ORAL DAILY
Status: DISCONTINUED | OUTPATIENT
Start: 2018-09-25 | End: 2018-09-25 | Stop reason: HOSPADM

## 2018-09-24 RX ORDER — OXYCODONE AND ACETAMINOPHEN 5; 325 MG/1; MG/1
2 TABLET ORAL EVERY 4 HOURS PRN
Status: DISCONTINUED | OUTPATIENT
Start: 2018-09-24 | End: 2018-09-25 | Stop reason: HOSPADM

## 2018-09-24 RX ORDER — ONDANSETRON 2 MG/ML
4 INJECTION INTRAMUSCULAR; INTRAVENOUS EVERY 12 HOURS PRN
Status: DISCONTINUED | OUTPATIENT
Start: 2018-09-24 | End: 2018-09-25 | Stop reason: HOSPADM

## 2018-09-24 RX ORDER — DEXAMETHASONE SODIUM PHOSPHATE 4 MG/ML
INJECTION, SOLUTION INTRA-ARTICULAR; INTRALESIONAL; INTRAMUSCULAR; INTRAVENOUS; SOFT TISSUE
Status: DISCONTINUED | OUTPATIENT
Start: 2018-09-24 | End: 2018-09-24

## 2018-09-24 RX ORDER — ROPIVACAINE HYDROCHLORIDE 5 MG/ML
INJECTION, SOLUTION EPIDURAL; INFILTRATION; PERINEURAL
Status: COMPLETED | OUTPATIENT
Start: 2018-09-24 | End: 2018-09-24

## 2018-09-24 RX ORDER — FENTANYL CITRATE 50 UG/ML
25 INJECTION, SOLUTION INTRAMUSCULAR; INTRAVENOUS EVERY 5 MIN PRN
Status: COMPLETED | OUTPATIENT
Start: 2018-09-24 | End: 2018-09-24

## 2018-09-24 RX ORDER — CLINDAMYCIN HYDROCHLORIDE 150 MG/1
300 CAPSULE ORAL EVERY 8 HOURS
Status: DISCONTINUED | OUTPATIENT
Start: 2018-09-24 | End: 2018-09-25 | Stop reason: HOSPADM

## 2018-09-24 RX ORDER — KETAMINE HYDROCHLORIDE 10 MG/ML
INJECTION, SOLUTION INTRAMUSCULAR; INTRAVENOUS
Status: DISCONTINUED | OUTPATIENT
Start: 2018-09-24 | End: 2018-09-24

## 2018-09-24 RX ORDER — EPHEDRINE SULFATE 50 MG/ML
INJECTION, SOLUTION INTRAVENOUS
Status: DISCONTINUED | OUTPATIENT
Start: 2018-09-24 | End: 2018-09-24

## 2018-09-24 RX ORDER — FAMOTIDINE 10 MG/ML
INJECTION INTRAVENOUS
Status: DISCONTINUED | OUTPATIENT
Start: 2018-09-24 | End: 2018-09-24

## 2018-09-24 RX ORDER — LIDOCAINE HYDROCHLORIDE 10 MG/ML
1 INJECTION, SOLUTION EPIDURAL; INFILTRATION; INTRACAUDAL; PERINEURAL ONCE
Status: COMPLETED | OUTPATIENT
Start: 2018-09-24 | End: 2018-09-24

## 2018-09-24 RX ORDER — HEPARIN SODIUM 5000 [USP'U]/ML
5000 INJECTION, SOLUTION INTRAVENOUS; SUBCUTANEOUS EVERY 8 HOURS
Status: DISCONTINUED | OUTPATIENT
Start: 2018-09-24 | End: 2018-09-25 | Stop reason: HOSPADM

## 2018-09-24 RX ORDER — MUPIROCIN 20 MG/G
1 OINTMENT TOPICAL 2 TIMES DAILY
Status: DISCONTINUED | OUTPATIENT
Start: 2018-09-24 | End: 2018-09-25 | Stop reason: HOSPADM

## 2018-09-24 RX ORDER — CEFAZOLIN SODIUM 1 G/3ML
2 INJECTION, POWDER, FOR SOLUTION INTRAMUSCULAR; INTRAVENOUS
Status: DISCONTINUED | OUTPATIENT
Start: 2018-09-24 | End: 2018-09-24

## 2018-09-24 RX ADMIN — ROCURONIUM BROMIDE 10 MG: 10 INJECTION, SOLUTION INTRAVENOUS at 06:09

## 2018-09-24 RX ADMIN — MIDAZOLAM HYDROCHLORIDE 2 MG: 1 INJECTION, SOLUTION INTRAMUSCULAR; INTRAVENOUS at 03:09

## 2018-09-24 RX ADMIN — FAMOTIDINE 20 MG: 10 INJECTION, SOLUTION INTRAVENOUS at 04:09

## 2018-09-24 RX ADMIN — MIDAZOLAM HYDROCHLORIDE 1 MG: 1 INJECTION, SOLUTION INTRAMUSCULAR; INTRAVENOUS at 02:09

## 2018-09-24 RX ADMIN — FENTANYL CITRATE 50 MCG: 50 INJECTION INTRAMUSCULAR; INTRAVENOUS at 06:09

## 2018-09-24 RX ADMIN — ROPIVACAINE HYDROCHLORIDE 30 ML: 5 INJECTION, SOLUTION EPIDURAL; INFILTRATION; PERINEURAL at 02:09

## 2018-09-24 RX ADMIN — DEXAMETHASONE SODIUM PHOSPHATE 8 MG: 4 INJECTION, SOLUTION INTRAMUSCULAR; INTRAVENOUS at 05:09

## 2018-09-24 RX ADMIN — LIDOCAINE HYDROCHLORIDE 10 MG: 10 INJECTION, SOLUTION EPIDURAL; INFILTRATION; INTRACAUDAL; PERINEURAL at 12:09

## 2018-09-24 RX ADMIN — CLINDAMYCIN HYDROCHLORIDE 300 MG: 150 CAPSULE ORAL at 11:09

## 2018-09-24 RX ADMIN — NEOSTIGMINE METHYLSULFATE 4 MG: 1 INJECTION INTRAVENOUS at 07:09

## 2018-09-24 RX ADMIN — FENTANYL CITRATE 50 MCG: 50 INJECTION INTRAMUSCULAR; INTRAVENOUS at 02:09

## 2018-09-24 RX ADMIN — SODIUM CHLORIDE, SODIUM GLUCONATE, SODIUM ACETATE, POTASSIUM CHLORIDE, MAGNESIUM CHLORIDE, SODIUM PHOSPHATE, DIBASIC, AND POTASSIUM PHOSPHATE: .53; .5; .37; .037; .03; .012; .00082 INJECTION, SOLUTION INTRAVENOUS at 05:09

## 2018-09-24 RX ADMIN — PROPOFOL 100 MG: 10 INJECTION, EMULSION INTRAVENOUS at 03:09

## 2018-09-24 RX ADMIN — FENTANYL CITRATE 50 MCG: 50 INJECTION INTRAMUSCULAR; INTRAVENOUS at 04:09

## 2018-09-24 RX ADMIN — EPHEDRINE SULFATE 10 MG: 50 INJECTION, SOLUTION INTRAMUSCULAR; INTRAVENOUS; SUBCUTANEOUS at 04:09

## 2018-09-24 RX ADMIN — EPHEDRINE SULFATE 15 MG: 50 INJECTION, SOLUTION INTRAMUSCULAR; INTRAVENOUS; SUBCUTANEOUS at 07:09

## 2018-09-24 RX ADMIN — KETAMINE HYDROCHLORIDE 30 MG: 10 INJECTION, SOLUTION INTRAMUSCULAR; INTRAVENOUS at 03:09

## 2018-09-24 RX ADMIN — CLINDAMYCIN PHOSPHATE 900 MG: 18 INJECTION, SOLUTION INTRAVENOUS at 03:09

## 2018-09-24 RX ADMIN — OXYCODONE HYDROCHLORIDE AND ACETAMINOPHEN 2 TABLET: 5; 325 TABLET ORAL at 08:09

## 2018-09-24 RX ADMIN — LIDOCAINE HYDROCHLORIDE 100 MG: 20 INJECTION, SOLUTION INTRAVENOUS at 03:09

## 2018-09-24 RX ADMIN — GLYCOPYRROLATE 0.4 MG: 0.2 INJECTION, SOLUTION INTRAMUSCULAR; INTRAVENOUS at 07:09

## 2018-09-24 RX ADMIN — MUPIROCIN 1 G: 20 OINTMENT TOPICAL at 08:09

## 2018-09-24 RX ADMIN — FENTANYL CITRATE 50 MCG: 50 INJECTION INTRAMUSCULAR; INTRAVENOUS at 03:09

## 2018-09-24 RX ADMIN — EPHEDRINE SULFATE 15 MG: 50 INJECTION, SOLUTION INTRAMUSCULAR; INTRAVENOUS; SUBCUTANEOUS at 06:09

## 2018-09-24 RX ADMIN — HEPARIN SODIUM 5000 UNITS: 5000 INJECTION, SOLUTION INTRAVENOUS; SUBCUTANEOUS at 11:09

## 2018-09-24 RX ADMIN — SODIUM CHLORIDE: 0.9 INJECTION, SOLUTION INTRAVENOUS at 12:09

## 2018-09-24 RX ADMIN — FENTANYL CITRATE 50 MCG: 50 INJECTION INTRAMUSCULAR; INTRAVENOUS at 05:09

## 2018-09-24 RX ADMIN — ONDANSETRON 4 MG: 2 INJECTION INTRAMUSCULAR; INTRAVENOUS at 04:09

## 2018-09-24 RX ADMIN — CYCLOBENZAPRINE HYDROCHLORIDE 10 MG: 10 TABLET, FILM COATED ORAL at 08:09

## 2018-09-24 RX ADMIN — ROCURONIUM BROMIDE 50 MG: 10 INJECTION, SOLUTION INTRAVENOUS at 03:09

## 2018-09-24 NOTE — ANESTHESIA PROCEDURE NOTES
Paravertebral Single Injection Block(s)    Patient location during procedure: pre-op   Block not for primary anesthetic.  Reason for block: at surgeon's request and post-op pain management   Post-op Pain Location: bilateral breast pain  Start time: 9/24/2018 2:00 PM  Timeout: 9/24/2018 2:00 PM   End time: 9/24/2018 2:15 PM  Staffing  Anesthesiologist: Graciela Moore MD  Other anesthesia staff: Harpreet Lopez Jr., MD  Performed: other anesthesia staff   Preanesthetic Checklist  Completed: patient identified, site marked, surgical consent, pre-op evaluation, timeout performed, IV checked, risks and benefits discussed and monitors and equipment checked  Peripheral Block  Patient position: sitting  Prep: ChloraPrep  Patient monitoring: heart rate, cardiac monitor, continuous pulse ox, continuous capnometry and frequent blood pressure checks  Block type: paravertebral - thoracic  Laterality: bilateral  Injection technique: single shot  Needle  Needle type: Tuohy   Needle gauge: 17 G  Needle length: 3.5 in  Needle localization: anatomical landmarks     Assessment  Injection assessment: negative aspiration and negative parasthesia  Paresthesia pain: none  Heart rate change: no  Slow fractionated injection: yes  Additional Notes    T4 os at 3.5 and 3 cm  VSS.  [facility] RN monitoring vitals throughout procedure.  Patient tolerated procedure well.

## 2018-09-24 NOTE — H&P
[facility]  Plastic Surgery  History & Physical    Patient Name: Ira Novoa  MRN: [id number]  Admission Date: 9/24/2018  Attending Physician: Edgar Eden MD  Primary Care Provider: Evens Moreno Iii, MD    Patient information was obtained from patient and past medical records.     Subjective:     Chief Complaint/Reason for Admission: Breast Cancer Reconstruction    History of Present Illness: Patient is a 63 y.o. female with a new diagnosis of R breast cancer at R upper inner outer quadrant, invasive ductal cancer, found on workup of left breast pain. Bilateral nipple sparing mastectomies are planned, with R SLN biopsy. She reports being in otherwise good health, no smoking or DM. She has a hx of congenital hydrocephalus and has a  shunt in place.       No current facility-administered medications on file prior to encounter.      Current Outpatient Medications on File Prior to Encounter   Medication Sig    esomeprazole magnesium (NEXIUM) 10 mg GrPS Take 10 mg by mouth before breakfast.       Review of patient's allergies indicates:   Allergen Reactions    Penicillins Other (See Comments)     Yeast infection       Past Medical History:   Diagnosis Date    Hydrocephalus      Past Surgical History:   Procedure Laterality Date    BRAIN SURGERY       Family History     None        Tobacco Use    Smoking status: Never Smoker   Substance and Sexual Activity    Alcohol use: No    Drug use: Not on file    Sexual activity: Not on file     Review of Systems   Constitutional: no fever or chills  Cardiovascular: no chest pain or palpitations  Gastrointestinal: no nausea or vomiting, tolerating diet  Integument/Breast: no rash or pruritis  Musculoskeletal: no arthralgias or myalgias  Neurological: no seizures or tremors  Behavioral/Psych: no auditory or visual hallucinations      Objective:     Vital Signs (Most Recent):  Temp: 97.2 °F (36.2 °C) (09/24/18 1455)  Pulse: (!) 50 (09/24/18  1510)  Resp: 16 (09/24/18 1510)  BP: (!) 105/59 (09/24/18 1510)  SpO2: 97 % (09/24/18 1510) Vital Signs (24h Range):  Temp:  [97.2 °F (36.2 °C)-98.2 °F (36.8 °C)] 97.2 °F (36.2 °C)  Pulse:  [50-63] 50  Resp:  [13-22] 16  SpO2:  [97 %-100 %] 97 %  BP: ()/(51-85) 105/59     Weight: 69.4 kg (153 lb)  Body mass index is 26.26 kg/m².    Physical Exam   General: no distress  Eyes:  conjunctivae/corneas clear.  Lungs:  clear to auscultation bilaterally and normal respiratory effort  Chest Wall: well healed scars from  shunt  Abdomen: soft, non-tender non-distented; bowel sounds normal; no masses,  no organomegaly and well healed scars from  shunt  Extremities: no cyanosis or edema, or clubbing  Skin: Skin color, texture, turgor normal. No rashes or lesions  Neurologic: Alert and oriented. Thought content appropriate    Significant Labs:  CBC: No results for input(s): WBC, RBC, HGB, HCT, PLT, MCV, MCH, MCHC in the last 168 hours.  BMP: No results for input(s): GLU, NA, K, CL, CO2, BUN, CREATININE, CALCIUM, MG in the last 168 hours.    Significant Diagnostics:  I have reviewed all pertinent imaging results/findings within the past 24 hours.    Assessment/Plan:     Breast cancer    63F w/ new dx of R invasive ductal cancer with ER+OH-, HER2-, and chronic left breast pain. Planned for bilateral nipple sparing mastectomies    Plan:  -plan for direct to implant reconstruction  -discussed risks and alternative options at clinic appointment, including TE, no reconstruction, autologous tissue, patient desires implant based reconstruction.   -also discussed possibility of nipple necrosis, infection, bleeding, need for future surgeries              VTE Risk Mitigation (From admission, onward)        Ordered     IP VTE HIGH RISK PATIENT  Once      09/24/18 1148     Place VONNIE hose  Until discontinued      09/24/18 1148     Place sequential compression device  Until discontinued      09/24/18 1148          Rayna Pineda  MD  Plastic Surgery  [facility]

## 2018-09-24 NOTE — ANESTHESIA PREPROCEDURE EVALUATION
09/24/2018  Pre-operative evaluation for Procedure(s) (LRB):  MASTECTOMY (Bilateral)  BIOPSY, LYMPH NODE, SENTINEL (Right)  INJECTION, FOR SENTINEL NODE IDENTIFICATION (Right)  INSERTION, BREAST IMPLANT (Bilateral)  INSERTION, TISSUE EXPANDER, BREAST (Bilateral)    Ira Novoa is a 63 y.o. female      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 breast    Breast cancer       Review of patient's allergies indicates:   Allergen Reactions    Penicillins Other (See Comments)     Yeast infection       No current facility-administered medications on file prior to encounter.      Current Outpatient Medications on File Prior to Encounter   Medication Sig Dispense Refill    esomeprazole magnesium (NEXIUM) 10 mg GrPS Take 10 mg by mouth before breakfast.         Past Surgical History:   Procedure Laterality Date    BRAIN SURGERY         Social History     Socioeconomic History    Marital status:      Spouse name: Not on file    Number of children: Not on file    Years of education: Not on file    Highest education level: Not on file   Social Needs    Financial resource strain: Not on file    Food insecurity - worry: Not on file    Food insecurity - inability: Not on file    Transportation needs - medical: Not on file    Transportation needs - non-medical: Not on file   Occupational History    Not on file   Tobacco Use    Smoking status: Never Smoker   Substance and Sexual Activity    Alcohol use: No    Drug use: Not on file    Sexual activity: Not on file   Other Topics Concern    Not on file   Social History Narrative    Not on file       Diagnostic Studies:      EKG:  None on file    2D Echo:  No results found for this or any previous visit.      Anesthesia Evaluation    I have reviewed the Patient Summary Reports.    I have  reviewed the Nursing Notes.   I have reviewed the Medications.     Review of Systems  Anesthesia Hx:  Denies Family Hx of Anesthesia complications.   Denies Personal Hx of Anesthesia complications.   Hematology/Oncology:        Current/Recent Cancer. Breast right   EENT/Dental:EENT/Dental Normal   Cardiovascular:  Cardiovascular Normal     Pulmonary:  Pulmonary Normal    Renal/:  Renal/ Normal     Hepatic/GI:   GERD, well controlled    Musculoskeletal:  Musculoskeletal Normal    Neurological:   Chronic idiopathic hydrocephalus with presence of functional  shunt. Nervous System Malformations, Hydrocephalus, s/p  shunt, stable, congenital    Endocrine:  Endocrine Normal    Psych:  Psychiatric Normal           Physical Exam  General:  Well nourished    Airway/Jaw/Neck:  Airway Findings: Mouth Opening: Normal Tongue: Normal  General Airway Assessment: Adult  Mallampati: III  Improves to II with phonation.  TM Distance: Normal, at least 6 cm  Jaw/Neck Findings:  Neck ROM: Normal ROM      Dental:  Dental Findings: In tact   Chest/Lungs:  Chest/Lungs Findings: Clear to auscultation, Normal Respiratory Rate     Heart/Vascular:  Heart Findings: Rate: Normal  Rhythm: Regular Rhythm        Mental Status:  Mental Status Findings:  Cooperative, Alert and Oriented         Anesthesia Plan  Type of Anesthesia, risks & benefits discussed:  Anesthesia Type:  general, regional  Patient's Preference:   Intra-op Monitoring Plan: standard ASA monitors  Intra-op Monitoring Plan Comments:   Post Op Pain Control Plan: multimodal analgesia and per primary service following discharge from PACU  Post Op Pain Control Plan Comments:   Induction:   IV  Beta Blocker:  Patient is not currently on a Beta-Blocker (No further documentation required).       Informed Consent: Patient understands risks and agrees with Anesthesia plan.  Questions answered. Anesthesia consent signed with patient.  ASA Score: 2     Day of Surgery Review of History &  Physical:    H&P update referred to the surgeon.         Ready For Surgery From Anesthesia Perspective.

## 2018-09-24 NOTE — H&P
History and Physical  Department of Surgery        CHIEF COMPLAINT: right breast cancer     Subjective:       Ira Novoa is a 63 y.o. postmenopausal female referred for evaluation of recently diagnosed carcinoma of the right breast. The patient was initially referred for surgical evaluation of an abnormal mammogram first noted 2018. Follow-up imaging showed an irregularly shaped, non-parallel, hypoechoic mass with indistinct margins with shadowing seen in the upper inner quadrant of the right breast with a 5 mm oval shaped hypoechoic mass adjacent to the shadowing mass and a 6 mm oval shaped hypoechoic mass 1.3 cm from the shadowing mass.   Patient was initially evaluated for left breast pain. Reports severe constant left breast pain which causes disruption of sleep and is not relieved by pain medications. This pain prompted her to obtain a mammogram which is when the right breast mass was noted.      Patient does routinely do self breast exams.  Patient has not noted a change on breast exam.  Patient denies nipple discharge. Patient denies to previous breast biopsy. Patient denies a personal history of breast cancer.     Patient is a nonsmoker. No diabetes. Patient has history of hydrocephalus for which a  shunt was placed at age 6 weeks and has had 7 revisions since. Shunt runs from neck around left shoulder and down midline.     Findings at that time were the following:   Tumor size: 0.8 cm   Tumor ndgndrndanddndend:nd nd2nd Estrogen Receptor: +   Progesterone Receptor: -   Her-2 sri: insufficient   Lymph node status: clinically negative         GYN History:  Age of menarche was 11. Age of menopause was 56.  Patient denies hormonal therapy. Patient is . Age of first live birth was 29.      FAMILY History:  No breast or ovarian cancer          Past Medical History:   Diagnosis Date    Hydrocephalus              Past Surgical History:   Procedure Laterality Date    BRAIN SURGERY                 Current  Outpatient Medications on File Prior to Visit   Medication Sig Dispense Refill    esomeprazole magnesium (NEXIUM) 10 mg GrPS Take 10 mg by mouth before breakfast.          No current facility-administered medications on file prior to visit.       Social History               Socioeconomic History    Marital status:        Spouse name: Not on file    Number of children: Not on file    Years of education: Not on file    Highest education level: Not on file   Social Needs    Financial resource strain: Not on file    Food insecurity - worry: Not on file    Food insecurity - inability: Not on file    Transportation needs - medical: Not on file    Transportation needs - non-medical: Not on file   Occupational History    Not on file   Tobacco Use    Smoking status: Never Smoker   Substance and Sexual Activity    Alcohol use: No    Drug use: Not on file    Sexual activity: Not on file   Other Topics Concern    Not on file   Social History Narrative    Not on file         History reviewed. No pertinent family history.      Review of Systems  Review of Systems   Constitutional: Negative for chills and fever.   HENT: Negative for congestion and sore throat.    Respiratory: Negative for cough and shortness of breath.    Cardiovascular: Negative for chest pain and palpitations.   Gastrointestinal: Negative for abdominal distention and abdominal pain.   Musculoskeletal: Negative for arthralgias and myalgias.   Neurological: Negative for dizziness and headaches.   Psychiatric/Behavioral: Negative for agitation and confusion.         Objective:   PHYSICAL EXAM:  Vitals:    09/24/18 1201   BP: 130/85   Pulse: (!) 52   Resp: 16   Temp: 98.2 °F (36.8 °C)          Physical Exam   Constitutional: She is oriented to person, place, and time. She appears well-developed and well-nourished. No distress.   Cardiovascular: Normal rate and regular rhythm.    Pulmonary/Chest: Effort normal. Right breast exhibits mass.  Right breast exhibits no inverted nipple, no nipple discharge, no skin change and no tenderness. Left breast exhibits tenderness (Generalized left breast tenderness and axillary tenderness). Left breast exhibits no inverted nipple, no mass, no nipple discharge and no skin change.       Neurological: She is alert and oriented to person, place, and time.   Skin: Skin is warm and dry.     Psychiatric: She has a normal mood and affect. Her behavior is normal.            Assessment:       Ira Novoa is a 63 y.o. postmenopausal female with recently diagnosed carcinoma of the right breast.      Plan:        To OR for bilateral total mastectomy with right axillary SLNB and implant/tissue expander reconstruction    Harjinder Bryant M.D.  General Surgery PGY3  [phone]

## 2018-09-24 NOTE — SUBJECTIVE & OBJECTIVE
No current facility-administered medications on file prior to encounter.      Current Outpatient Medications on File Prior to Encounter   Medication Sig    esomeprazole magnesium (NEXIUM) 10 mg GrPS Take 10 mg by mouth before breakfast.       Review of patient's allergies indicates:   Allergen Reactions    Penicillins Other (See Comments)     Yeast infection       Past Medical History:   Diagnosis Date    Hydrocephalus      Past Surgical History:   Procedure Laterality Date    BRAIN SURGERY       Family History     None        Tobacco Use    Smoking status: Never Smoker   Substance and Sexual Activity    Alcohol use: No    Drug use: Not on file    Sexual activity: Not on file     Review of Systems   Constitutional: no fever or chills  Cardiovascular: no chest pain or palpitations  Gastrointestinal: no nausea or vomiting, tolerating diet  Integument/Breast: no rash or pruritis  Musculoskeletal: no arthralgias or myalgias  Neurological: no seizures or tremors  Behavioral/Psych: no auditory or visual hallucinations      Objective:     Vital Signs (Most Recent):  Temp: 97.2 °F (36.2 °C) (09/24/18 1455)  Pulse: (!) 50 (09/24/18 1510)  Resp: 16 (09/24/18 1510)  BP: (!) 105/59 (09/24/18 1510)  SpO2: 97 % (09/24/18 1510) Vital Signs (24h Range):  Temp:  [97.2 °F (36.2 °C)-98.2 °F (36.8 °C)] 97.2 °F (36.2 °C)  Pulse:  [50-63] 50  Resp:  [13-22] 16  SpO2:  [97 %-100 %] 97 %  BP: ()/(51-85) 105/59     Weight: 69.4 kg (153 lb)  Body mass index is 26.26 kg/m².    Physical Exam   General: no distress  Eyes:  conjunctivae/corneas clear.  Lungs:  clear to auscultation bilaterally and normal respiratory effort  Chest Wall: well healed scars from  shunt  Abdomen: soft, non-tender non-distented; bowel sounds normal; no masses,  no organomegaly and well healed scars from  shunt  Extremities: no cyanosis or edema, or clubbing  Skin: Skin color, texture, turgor normal. No rashes or lesions  Neurologic: Alert and  oriented. Thought content appropriate    Significant Labs:  CBC: No results for input(s): WBC, RBC, HGB, HCT, PLT, MCV, MCH, MCHC in the last 168 hours.  BMP: No results for input(s): GLU, NA, K, CL, CO2, BUN, CREATININE, CALCIUM, MG in the last 168 hours.    Significant Diagnostics:  I have reviewed all pertinent imaging results/findings within the past 24 hours.

## 2018-09-24 NOTE — HPI
Patient is a 63 y.o. female with a new diagnosis of R breast cancer at R upper inner outer quadrant, invasive ductal cancer, found on workup of left breast pain. Bilateral nipple sparing mastectomies are planned, with R SLN biopsy. She reports being in otherwise good health, no smoking or DM. She has a hx of congenital hydrocephalus and has a  shunt in place.

## 2018-09-24 NOTE — PROGRESS NOTES
Dr. Henry at bedside. Informed Dr. Henry that patient a difficult IV stick, 20 gauge to left hand.

## 2018-09-25 VITALS
HEART RATE: 76 BPM | WEIGHT: 153 LBS | SYSTOLIC BLOOD PRESSURE: 100 MMHG | OXYGEN SATURATION: 99 % | BODY MASS INDEX: 26.12 KG/M2 | TEMPERATURE: 97 F | HEIGHT: 64 IN | DIASTOLIC BLOOD PRESSURE: 57 MMHG | RESPIRATION RATE: 18 BRPM

## 2018-09-25 PROCEDURE — 63600175 PHARM REV CODE 636 W HCPCS: Performed by: SURGERY

## 2018-09-25 PROCEDURE — G0378 HOSPITAL OBSERVATION PER HR: HCPCS

## 2018-09-25 PROCEDURE — 25000003 PHARM REV CODE 250: Performed by: SURGERY

## 2018-09-25 RX ORDER — HYDROCODONE BITARTRATE AND ACETAMINOPHEN 5; 325 MG/1; MG/1
1 TABLET ORAL EVERY 6 HOURS PRN
Qty: 30 TABLET | Refills: 0 | Status: SHIPPED | OUTPATIENT
Start: 2018-09-25 | End: 2018-10-23

## 2018-09-25 RX ORDER — CLINDAMYCIN HYDROCHLORIDE 300 MG/1
300 CAPSULE ORAL EVERY 8 HOURS
Qty: 30 CAPSULE | Refills: 0 | Status: SHIPPED | OUTPATIENT
Start: 2018-09-25 | End: 2018-10-05

## 2018-09-25 RX ORDER — CYCLOBENZAPRINE HCL 10 MG
10 TABLET ORAL 3 TIMES DAILY
Qty: 30 TABLET | Refills: 0 | Status: SHIPPED | OUTPATIENT
Start: 2018-09-25 | End: 2018-10-05

## 2018-09-25 RX ADMIN — PANTOPRAZOLE SODIUM 40 MG: 40 TABLET, DELAYED RELEASE ORAL at 08:09

## 2018-09-25 RX ADMIN — HEPARIN SODIUM 5000 UNITS: 5000 INJECTION, SOLUTION INTRAVENOUS; SUBCUTANEOUS at 02:09

## 2018-09-25 RX ADMIN — CLINDAMYCIN HYDROCHLORIDE 300 MG: 150 CAPSULE ORAL at 05:09

## 2018-09-25 RX ADMIN — HEPARIN SODIUM 5000 UNITS: 5000 INJECTION, SOLUTION INTRAVENOUS; SUBCUTANEOUS at 05:09

## 2018-09-25 RX ADMIN — CYCLOBENZAPRINE HYDROCHLORIDE 10 MG: 10 TABLET, FILM COATED ORAL at 08:09

## 2018-09-25 RX ADMIN — OXYCODONE HYDROCHLORIDE AND ACETAMINOPHEN 1 TABLET: 5; 325 TABLET ORAL at 10:09

## 2018-09-25 RX ADMIN — MUPIROCIN 1 G: 20 OINTMENT TOPICAL at 08:09

## 2018-09-25 RX ADMIN — CYCLOBENZAPRINE HYDROCHLORIDE 10 MG: 10 TABLET, FILM COATED ORAL at 02:09

## 2018-09-25 RX ADMIN — CLINDAMYCIN HYDROCHLORIDE 300 MG: 150 CAPSULE ORAL at 02:09

## 2018-09-25 NOTE — SUBJECTIVE & OBJECTIVE
Interval History:     -No acute events overnight. VSS. Afebrile.   -Notes adequate pain control overnight; only requesting tylenol for current pain regimen  -States she feels comfortable with drain care; expressed concern that she feels her breasts are smaller which she was not expecting  -wound vacs remain in place to breasts bilaterally; good seal appreciated    Medications:  Continuous Infusions:  Scheduled Meds:   clindamycin  300 mg Oral Q8H    cyclobenzaprine  10 mg Oral TID    heparin (porcine)  5,000 Units Subcutaneous Q8H    mupirocin  1 g Nasal BID    pantoprazole  40 mg Oral Daily     PRN Meds:HYDROmorphone, ondansetron, oxyCODONE-acetaminophen, oxyCODONE-acetaminophen     Review of patient's allergies indicates:   Allergen Reactions    Penicillins Other (See Comments)     Yeast infection     Objective:     Vital Signs (Most Recent):  Temp: 96.6 °F (35.9 °C) (09/25/18 0439)  Pulse: (!) 58 (09/25/18 0439)  Resp: 18 (09/25/18 0439)  BP: (!) 95/55 (09/25/18 0439)  SpO2: 97 % (09/25/18 0439) Vital Signs (24h Range):  Temp:  [96.5 °F (35.8 °C)-98.2 °F (36.8 °C)] 96.6 °F (35.9 °C)  Pulse:  [50-76] 58  Resp:  [10-22] 18  SpO2:  [94 %-100 %] 97 %  BP: ()/(51-85) 95/55     Weight: 69.4 kg (153 lb)  Body mass index is 26.26 kg/m².    Intake/Output - Last 3 Shifts       09/23 0700 - 09/24 0659 09/24 0700 - 09/25 0659 09/25 0700 - 09/26 0659    I.V. (mL/kg)  1300 (18.7)     Total Intake(mL/kg)  1300 (18.7)     Urine (mL/kg/hr)  1000     Drains  311     Total Output  1311     Net  -11                  Physical Exam   Constitutional: She is oriented to person, place, and time. She appears well-developed and well-nourished.   HENT:   Head: Normocephalic and atraumatic.   Eyes: Conjunctivae are normal. Pupils are equal, round, and reactive to light.   Neck: Normal range of motion. Neck supple.   Cardiovascular: Normal rate and regular rhythm.   Pulmonary/Chest: Effort normal. No respiratory distress.    Abdominal: Soft. She exhibits no distension.   Musculoskeletal: Normal range of motion. She exhibits no edema.   Neurological: She is alert and oriented to person, place, and time.   Skin: Skin is warm and dry.   ELENI drains to chest draining sanguinous fluid. Wound vacs in place to breasts bilaterally; good seal appreciated.   Psychiatric: She has a normal mood and affect. Her behavior is normal. Judgment normal.     Significant Labs:  CBC: No results for input(s): WBC, RBC, HGB, HCT, PLT, MCV, MCH, MCHC in the last 168 hours.  BMP: No results for input(s): GLU, NA, K, CL, CO2, BUN, CREATININE, CALCIUM, MG in the last 168 hours.    Significant Diagnostics:  I have reviewed all pertinent imaging results/findings within the past 24 hours.

## 2018-09-25 NOTE — DISCHARGE INSTRUCTIONS
Discharge Instructions: Caring for Your Sushant-Frances Drainage Tube  Your doctor discharges you with a Sushant-Frances drainage tube. Doctors commonly leave this drain within the abdominal cavity after surgery. It helps drain and collect blood and body fluid after surgery. This can prevent swelling and reduces the risk for infection. The tube is held in place by a few stitches. It is covered with a bandage. Your doctor will remove the drain when he or she determines you no longer need it.  Home care  · Dont sleep on the same side as the tube.  · Secure the tube and bag inside your clothing with a safety pin. This helps keep the tube from being pulled out.  · Empty your drain at least twice a day. Empty it more often if the drain is full. Wash  and dry your hands before emptying the drain.  ¨ Lift the opening on the drain.  ¨ Drain the fluid into a measuring cup.  ¨ Record the amount of fluid each time you empty the drain. Include the date and time it was emptied. Share this information with your doctor on your next visit.  ¨ Squeeze the bulb with your hands until you hear air coming out of the bulb if your doctor has instructed you to do so (sometimes the bulb is used as a reservoir without suction). Check with your doctor about specific drain instructions.  ¨ Close the opening.  · Change the dressing around the tube every day.  ¨ Wash your hands.  ¨ Remove the old bandage.  ¨ Wash your hands again.  ¨ Wet a cotton swab and clean the skin around the incision and tube site. Use normal saline solution (salt and water). Or, you can use warm, soapy water.  ¨ Put a new bandage on the incision and tube site. Make the bandage large enough to cover the whole incision area.  ¨ Tape the bandage in place.  · Keep the bandage and tube site dry when you shower. Ask your healthcare provider about the best way to do this.  · Stripping the tube helps keep blood clots from blocking the tube. Ask your nurse how often you should  strip the tube. Stripping may not be needed, depending on where and why your doctor placed the tube. It may even be dangerous in some cases.   ¨ Hold the tubing where it leaves the skin, with one hand. This keeps it from pulling on the skin.  ¨ Pinch the tubing with the thumb and first finger of your other hand.  ¨ Slowly and firmly pull your thumb and first finger down the tubing. You may find it helpful to hold an alcohol swab between your fingers and the tube to lubricate the tubing.  ¨ If the pulling hurts or feels like its coming out of the skin, stop. Begin again more gently.  Follow-up care  Make a follow-up appointment as directed by our staff.     When to seek medical care  Call your healthcare provider right away if you have any of the following:  · New or increased pain around the tube  · Redness, swelling, or warmth around the incision or tube  · Drainage that is foul-smelling  · Vomiting  · Fever of 100.4°F (38°C)  · Fluid leaking around the tube  · Incision seems not to be healing  · Stitches become loose  · Tube falls out or breaks  · Drainage that changes from light pink to dark red  · Blood clots in the drainage bulb  · A sudden increase or decrease in the amount of drainage (over 30 mL)   Date Last Reviewed: 2/1/2017 © 2000-2017 [employer]. [address] [place] [address], [place] [address]. All rights reserved. This information is not intended as a substitute for professional medical care. Always follow your healthcare professional's instruction  Managing Pain After Surgery    Once youre home after surgery, you may have some pain, since even minor surgery causes swelling and breakdown of tissue. When it comes to managing pain, the tips you may have learned in the hospital also work at home. To get the best pain relief possible, remember the points below.  Special note: Be sure to follow any specific post-surgery instructions from your surgeon or nurse.   Use your medicine only as  directed  · If your pain is not relieved or if it gets worse, call your healthcare provider.  · If pain lessens, try taking your medicine less often or in smaller doses.  · Non-pharmacological methods of pain relief are also beneficial and may help your pain medicine work better. Ask your healthcare provider for suggestions.  Remember that medicines need time to work  · Most pain relievers taken by mouth need at least 20 to 30 minutes to start to work. They may not reach their maximum effect for close to an hour.   · Take pain medicine at regular times as directed. Dont wait until the pain gets bad to take it.  Time your medicine  · Try to time your medicine so that you take it before starting an activity, such as dressing or sitting at the table for dinner.  · Taking your medicine at night may help you get a good nights rest.  Eat lots of fruit and vegetables  · Constipation is a common side effect with some pain medicines. Eating fruit, vegetables, and other foods high in fiber can help.  · Drink plenty of fluids.  · Talk to your healthcare provider about taking a preventive bowel regimen.  Don't drink alcohol while taking pain medicine  · Mixing alcohol and pain medicine can cause dizziness and slow your respiratory system. It can even be fatal.     Don't drive or use machinery while taking pain medicines that can cause drowsiness.   Note these other precautions  · Ask your healthcare provider or pharmacist how to get rid of your pain medicines safely when you stop using them.  · Never share your pain medicines with anyone.  · Store your medicines in a safe place so they cant be stolen. If you think your medicine has been stolen or lost, tell your healthcare provider right away.  Date Last Reviewed: 5/1/2017 © 2000-2017 [employer]. [address] [place] [address], [place] [address]. All rights reserved. This information is not intended as a substitute for professional medical care. Always follow your  healthcare professional's instructions.

## 2018-09-25 NOTE — NURSING TRANSFER
Nursing Transfer Note      9/24/2018     Transfer 540 A    Transfer via stretcher    Transfer with SCDs, WV    Transported by Transport    Medicines sent: Bacitracin    Chart send with patient: yes    Notified: spouse, New RN    Patient reassessed at: Upon arrival to the unit

## 2018-09-25 NOTE — PLAN OF CARE
POC reviewed with pt, acknowledged understanding. Pt AAO x 4. Pt remains free of falls/injuries. Pt on telemetry remains SR. Pt tolerating clear liquid diet. Pt pain controlled with prescribed meds. Pt has WV to L and R breast set at 125 continuous suction. Pt has Bilateral x 2 ELENI drains to the breast, output recorded. Pt due to void herrera removed in the OR at the end of the case. No acute events throughout shift. No distress noted, will continue to monitor.

## 2018-09-25 NOTE — PLAN OF CARE
Problem: Patient Care Overview  Goal: Plan of Care Review  Outcome: Ongoing (interventions implemented as appropriate)  Patient AAOx4 and VSS throughout the night. Q2H rounding and white board updating maintained. Call bell within reach. Minimal complaints of pain throughout the night -- requested Percocet but advised patient not to take now due to her getting 2 at 21:00 and her BP is on the low side -- verbalized understanding. Wound vac and bulb suction devices continued and maintained. Gauze dressings applied to b/l ELENI drain insertion sites. No skin breakdown noted.  at bedside. Will continue to monitor and continue plan of care.

## 2018-09-25 NOTE — NURSING
Patient arrived at 22:00 and placed in room 540 A. Orientated to room, side rails up x 2, B/l SCD placed, ELENI drains emptied and secured to gown, gauze dressings applied to b/l ELENI drain insertion sites, wound vac continued att 125 mmHg continuous, VS taken and stable, and call bell within reach and instructed on how to use. Will continue to monitor.

## 2018-09-25 NOTE — TRANSFER OF CARE
"Anesthesia Transfer of Care Note    Patient: Ira Novoa    Procedure(s) Performed: Procedure(s) (LRB):  MASTECTOMY (Bilateral)  BIOPSY, LYMPH NODE, SENTINEL (Right)  INJECTION, FOR SENTINEL NODE IDENTIFICATION (Right)  INSERTION, BREAST IMPLANT (Bilateral)  INSERTION, TISSUE EXPANDER, BREAST (Bilateral)    Patient location: PACU    Anesthesia Type: general    Transport from OR: Transported from OR on 6-10 L/min O2 by face mask with adequate spontaneous ventilation    Post pain: adequate analgesia    Post assessment: no apparent anesthetic complications    Post vital signs: stable    Level of consciousness: awake    Nausea/Vomiting: no nausea/vomiting    Complications: none    Transfer of care protocol was followed      Last vitals:   Visit Vitals  /60 (BP Location: Left arm, Patient Position: Lying)   Pulse (!) 50   Temp 36.2 °C (97.2 °F) (Oral)   Resp 18   Ht 5' 4" (1.626 m)   Wt 69.4 kg (153 lb)   SpO2 96%   Breastfeeding? No   BMI 26.26 kg/m²     "

## 2018-09-25 NOTE — NURSING
D/C instructions and RXs given to pt and family. Instructions also given on diet, mobility and follow up care. Pt and family verbalizes understandings of instructions. Instructions reinforced on ELENI drain and log and cup given to pt. PIV dc'ed per MD order, catheter tip intact. NADN. VSS. Pt waiting on on pt escort to leave the unit.

## 2018-09-25 NOTE — OP NOTE
DATE OF PROCEDURE:  09/24/2018    PREOPERATIVE DIAGNOSIS:  Breast cancer.    POSTOPERATIVE DIAGNOSIS:  Breast cancer.    PROCEDURES PERFORMED:  1.  Immediate bilateral breast reconstruction using implants.  2.  Placement of AlloDerm, right breast.  3.  Placement of AlloDerm, left breast.    SURGEON:  Donnie Wilde M.D., [facility]    ANESTHESIA:  General.    DESCRIPTION OF PROCEDURE:  After completion of the mastectomy, I entered the   room.  The mastectomy flaps were checked and noted to have adequate thickness   for prepectoral placement of implants.  Hemostasis was meticulously obtained.    Next, two 20 x 16 perforated sheets of AlloDerm were opened on the back table.    They were soaked in triple antibiotic solution.  The pockets of the mastectomy   were irrigated with Betadine irrigation followed by triple antibiotic solution.    The AlloDerm was then wrapped around 495 mL SCX implants.  They were secured   using 2-0 Vicryl.  The implants were then placed in the prepectoral space and   secured using 2-0 PDS circumferentially.  Approximately 15 sutures were used to   secure the implant to the chest wall.  Next, two drains were placed on each   side.  They were secured using 2-0 silk.  No excess skin was removed.  The   incisions were then closed using interrupted 3-0 Monocryl followed by running   4-0 Monocryl subcuticular suture.  Similar procedure and closure was performed   on the opposite side.  A Prevena incisional wound VAC was then placed.  There   were no complications with this procedure.  No specimens were sent.  Blood loss   was approximately 100 mL.  No complications.      CRB/HN  dd: 09/25/2018 15:59:02 (CDT)  td: 09/25/2018 16:31:29 (CDT)  Doc ID   #[id number]  Job ID #[id number]    CC:

## 2018-09-25 NOTE — OP NOTE
DATE OF PROCEDURE:  09/24/2018    PRIMARY SURGEON:  Edgar Eden M.D.    ASSISTANT SURGEON:  Royal Bryant M.D. (RES), Surgery resident.    PREOPERATIVE DIAGNOSIS:  Invasive carcinoma of the right breast, upper inner   quadrant.    POSTOPERATIVE DIAGNOSIS:  Invasive carcinoma of the right breast, upper inner   quadrant.    PROCEDURES PERFORMED:  Bilateral total complete mastectomies with nipple-sparing   technique with therapeutic mastectomy on the right and prophylactic mastectomy   on the left; injection of right breast with isosulfan Lymphazurin blue dye and   technetium labelled radiocolloid for sentinel lymph node identification and   mapping; mapping and identification of right deep axillary level 1 sentinel   lymph node; right deep axillary sentinel lymph node biopsy x1.    PROCEDURE IN DETAIL:  The patient underwent informed consent.  The history and   physical examination was reviewed and updated.  The bilateral breasts were   marked.  The patient was brought to the Operating Room after having undergone a   preoperative paravertebral block by the regional team in the preoperative   holding area.  Under general anesthesia, the patient was supine.  The bilateral   breasts were prepped and draped in a sterile fashion after the right anterior   subareolar tissue was injected with the technetium-labeled radiocolloid and   isosulfan Lymphazurin blue dye following induction of the general anesthetic.    The bilateral breasts, anterior chest, right arm and axilla were prepped and   draped in a sterile fashion.  We marked nipple-sparing mastectomies using   vertical radial incisions extending from the 6 o'clock inferior areolar margin   inferolaterally down to the inframammary fold bilaterally.  The procedure was   started on the right side.  The skin incision was made.  The nipple sparing   mastectomy was performed leaving only skin and subcutaneous tissue medially,   taking the dissection to the lateral  border of the sternum and preserving the   intercostal perforators.  We did not identify the  shunt, which the patient   states was traveling down the midline.  The superior dissection was carried   across the base of the nipple areolar complex, cephalad to the clavicle,   inferiorly was taken down to the inframammary fold.  Laterally, we identified   the anterior border of latissimus dorsi muscle, preserving the intercostal   lateral thoracic perforators.  The dissection was carried up and over the upper   outer quadrant axillary tail of Sinclair breast tissue to the level 1 region of   the axilla where we opened the clavipectoral fascia, identified a blue afferent   lymphatic channel leading to a level 1 sentinel lymph node, which was excised.    It was hot and blue with an ex vivo count of 2000.  We had taken the subareolar   tissue off of the nipple areolar complex, leaving only the skin of the nipple   areolar complex, leaving the vascularity and subdermal vascular plexus intact.    We had cored out the nipple.  This right subareolar tissue was submitted for   frozen section as specimen #1.  It was benign.  The sentinel lymph node was   submitted as specimen #2.  It was also benign with no evidence of metastatic   disease.  The breast was removed from a medial to lateral direction taking the   pectoralis fascia with it and it was oriented with a short stitch superiorly, a   single long stitch in the subareolar tissue and a double long stitch laterally.    It was submitted to Pathology for permanent sectioning.  It weighed over 400 g.    The skin flaps were inspected.  They were viable without evidence of ischemia.    The nipple areolar complex also appeared viable without evidence of ischemia.    Skin flaps were uniform in thickness, approximately 6 to 7 mm, leaving only   skin and subcutaneous tissue having performed the dissection outside of the   superficial investing fascial layer of the breast, carried  superolaterally   beyond the axillary tail to the level 1 region of the axilla to the point where   the sentinel lymph node biopsy had been taken.  With hemostasis achieved, the   site was packed with a normal saline moistened lap pad and attention was then   turned to the contralateral side.    A similar inferior radial incision was made from the infra-areolar margin down   to the inframammary fold extending it inferolaterally in a radial fashion.  The   skin was incised sharply.  The subcutaneous tissue was preserved with the breast   skin, keeping the dissection outside of the superficial investing fascial layer   of the breast, coming across the base of the nipple areolar complex, taking the   subareolar tissue separately and submitting it for permanent sectioning on the   left side.  The superior dissection was carried cephalad to the clavicle,   medially to the lateral border of the sternum, preserving the intercostal   perforators.  Again, we did not identify or see the  shunt at any time during   this portion of the procedure either.  The  shunt was never seen at any time   during the case.  The lateral dissection was carried to the anterior border of   the latissimus dorsi muscle, identifying the lateral border of the serratus   anterior muscle and the pectoralis musculature.  The inferior dissection was   taken down to the inframammary fold to the fascia of the rectus abdominis muscle   as it had been done on the contralateral side.  The upper outer quadrant   dissection was carried up and over the axillary tail of Sinclair breast tissue to   the level of the clavipectoral fascia.  The breast was removed from a medial to   lateral direction taking the pectoralis fascia with it.  This breast weighed   approximately 400 g and was similarly marked with a short stitch superiorly, a   single long stitch in the subareolar tissue and a double long stitch laterally.    It was submitted to Pathology for permanent  sectioning.  The wound was   irrigated.  It was made hemostatic with clips and cautery.  With hemostasis   achieved, the wound was irrigated.  The skin flaps were inspected.  The skin and   the nipple-areolar complex were viable without evidence of ischemia.  Skin   flaps again were approximately 6 to 7 mm, leaving only skin and subcutaneous   tissue.  The wound was packed with a normal saline moistened lap pad and the   case was turned over to the Plastic Surgery team for anticipated immediate   prepectoral implant reconstruction by Dr. Wilde.  She tolerated this portion   of the procedure well without complication.  Estimated blood loss had been   minimal.  All needle, instrument and sponge counts were accounted for.  She was   turned over to Plastic Surgery in a satisfactory condition.      MATT/IN  dd: 09/24/2018 20:50:16 (CDT)  td: 09/24/2018 21:13:52 (CDT)  Doc ID   #[id number]  Job ID #[id number]    CC:

## 2018-09-25 NOTE — NURSING
[facility] and spoke to Dr. Pineda with plastics about changing out pt wound vac to portable wound vac prior to discharge. Stated she would get in touch with the fellow and call me back.

## 2018-09-25 NOTE — DISCHARGE SUMMARY
[facility]  DISCHARGE SUMMARY  General Surgery      Admit Date:  9/24/2018    Discharge Date and Time:  9/25/2018  12:00 PM    Attending Physician:  Edgar Eden MD     Discharge Provider:  Harjinder Bryant MD     Reason for Admission:  Breast cancer     Procedures Performed:  Procedure(s) (LRB):  MASTECTOMY (Bilateral)  BIOPSY, LYMPH NODE, SENTINEL (Right)  INJECTION, FOR SENTINEL NODE IDENTIFICATION (Right)  INSERTION, BREAST IMPLANT (Bilateral)  INSERTION, TISSUE EXPANDER, BREAST (Bilateral)    Hospital Course:  Please see the preoperative H&P and other available documentation for full details related to history prior to this admission.  Briefly, Ira Novoa is a 63 y.o. female who was admitted following scheduled elective surgery for Breast cancer    Following a complete preoperative discussion of the risks and benefits of surgery with signed informed consent, the patient was taken to the operating room on 9/24/2018 and underwent the above stated procedures.  The patient tolerated surgery well and there were no complications.  Please see the operative report for full intraoperative findings and details.  Postoperatively, the patient did well and was transferred from the PACU to the floor in stable condition where they had a stable and uncomplicated hospital course.  Labs and vital signs remained stable and appropriate throughout course.  Diet was advanced as tolerated and the patient's pain was controlled on oral pain medications without problem.  Currently, the patient is doing well at 1 Day Post-Op and is stable and appropriate for discharge home at this time.      Consults:  None.    Significant Diagnostic Studies:   No results for input(s): WBC, HGB, HCT, PLT, BAND, METAMYELOCYT, MYELOPCT, HGBA1C in the last 72 hours.No results for input(s): NA, K, CL, CO2, BUN, CREATININE, GLU, CALCIUM, CAION, MG, PHOS, AST, ALT, ALKPHOS, BILITOT, BILIDIR, PROT, ALBUMIN, PREALBUMIN, AMYLASE,  LIPASE, CRP, HSCRP, SEDRATE, PROCAL in the last 72 hours.No results for input(s): INR, PTT, LABHEPA, LACTATE, TROPONINI, CPK, CPKMB, MB, BNP in the last 72 hours.No results for input(s): PH, PCO2, PO2, HCO3 in the last 72 hours.      Final Diagnoses:   Principal Problem:  Breast cancer   Secondary Diagnoses:    Active Hospital Problems    Diagnosis  POA    *Breast cancer [C50.919]  Yes      Resolved Hospital Problems   No resolved problems to display.       Discharged Condition:  Good    Disposition:  Home or Self Care    Follow Up/Patient Instructions:     Medications:  Reconciled Home Medications:    Current Discharge Medication List      START taking these medications    Details   clindamycin (CLEOCIN) 300 MG capsule Take 1 capsule (300 mg total) by mouth every 8 (eight) hours. for 10 days  Qty: 30 capsule, Refills: 0      cyclobenzaprine (FLEXERIL) 10 MG tablet Take 1 tablet (10 mg total) by mouth 3 (three) times daily. for 10 days  Qty: 30 tablet, Refills: 0      HYDROcodone-acetaminophen (NORCO) 5-325 mg per tablet Take 1 tablet by mouth every 6 (six) hours as needed for Pain.  Qty: 30 tablet, Refills: 0         CONTINUE these medications which have NOT CHANGED    Details   esomeprazole magnesium (NEXIUM) 10 mg GrPS Take 10 mg by mouth before breakfast.           Discharge Procedure Orders   Diet Adult Regular     Notify your health care provider if you experience any of the following:  severe uncontrolled pain     Notify your health care provider if you experience any of the following:  temperature >100.4     Change dressing (specify)   Order Comments: Incisional wound vac per plastic surgery recommondations     Notify your health care provider if you experience any of the following:  redness, tenderness, or signs of infection (pain, swelling, redness, odor or green/yellow discharge around incision site)     Activity as tolerated     Shower on day dressing removed (No bath)     Follow-up Information      Edgar Eden MD In 2 weeks.    Specialty:  General Surgery  Contact information:  [address]  [facility] [address]  [phone]             Donnie Wilde MD In 1 week.    Specialty:  Plastic Surgery  Contact information:  [address]  [facility] [address]  [phone]                   Harjinder Bryant MD

## 2018-09-25 NOTE — BRIEF OP NOTE
[facility]  Brief Operative Note     SUMMARY     Surgery Date: 9/24/2018     Surgeon(s) and Role:  Panel 1:     * Edgar Eden MD - Primary     * Harjinder Bryant MD - Resident - Assisting  Panel 2:     * Donnie Wilde MD - Primary        Pre-op Diagnosis:  Primary cancer of upper inner quadrant of right female breast [C50.211]    Post-op Diagnosis:  Post-Op Diagnosis Codes:     * Primary cancer of upper inner quadrant of right female breast [C50.211]    Procedure(s) (LRB):  MASTECTOMY (Bilateral)  BIOPSY, LYMPH NODE, SENTINEL (Right)  INJECTION, FOR SENTINEL NODE IDENTIFICATION (Right)  INSERTION, BREAST IMPLANT (Bilateral)  INSERTION, TISSUE EXPANDER, BREAST (Bilateral)    Anesthesia: General    Description of the findings of the procedure: 495cc bilateral direct to implant reconstruction    Findings/Key Components: see op note    Estimated Blood Loss: * No values recorded between 9/24/2018  4:15 PM and 9/24/2018  8:05 PM *         Specimens:   Specimen (12h ago, onward)    Start     Ordered    09/24/18 1834  Specimen to Pathology - Surgery  Once     Comments:  1. Right subareolar tissue - frozen 2. Right axillary sentinel node hot 2000- frozen3. Right breast.  Short stitch superior, single long stitch subareola, double long stitch lateral4. Left subareolar tissue- permanent5. Left breast.  Short stitch superior, single long stitch subareola, double long stitch lateral     Start Status   09/24/18 1834 Collected (09/24/18 1835)       09/24/18 1834

## 2018-09-25 NOTE — PROGRESS NOTES
[facility]  Plastic Surgery  Progress Note    Subjective:     History of Present Illness:  Patient is a 63 y.o. female with a new diagnosis of R breast cancer at R upper inner outer quadrant, invasive ductal cancer, found on workup of left breast pain. Bilateral nipple sparing mastectomies are planned, with R SLN biopsy. She reports being in otherwise good health, no smoking or DM. She has a hx of congenital hydrocephalus and has a  shunt in place.       Post-Op Info:  Procedure(s) (LRB):  MASTECTOMY (Bilateral)  BIOPSY, LYMPH NODE, SENTINEL (Right)  INJECTION, FOR SENTINEL NODE IDENTIFICATION (Right)  INSERTION, BREAST IMPLANT (Bilateral)  INSERTION, TISSUE EXPANDER, BREAST (Bilateral)   1 Day Post-Op     Interval History:     -No acute events overnight. VSS. Afebrile.   -Notes adequate pain control overnight; only requesting tylenol for current pain regimen  -States she feels comfortable with drain care; expressed concern that she feels her breasts are smaller which she was not expecting  -wound vacs remain in place to breasts bilaterally; good seal appreciated    Medications:  Continuous Infusions:  Scheduled Meds:   clindamycin  300 mg Oral Q8H    cyclobenzaprine  10 mg Oral TID    heparin (porcine)  5,000 Units Subcutaneous Q8H    mupirocin  1 g Nasal BID    pantoprazole  40 mg Oral Daily     PRN Meds:HYDROmorphone, ondansetron, oxyCODONE-acetaminophen, oxyCODONE-acetaminophen     Review of patient's allergies indicates:   Allergen Reactions    Penicillins Other (See Comments)     Yeast infection     Objective:     Vital Signs (Most Recent):  Temp: 96.6 °F (35.9 °C) (09/25/18 0439)  Pulse: (!) 58 (09/25/18 0439)  Resp: 18 (09/25/18 0439)  BP: (!) 95/55 (09/25/18 0439)  SpO2: 97 % (09/25/18 0439) Vital Signs (24h Range):  Temp:  [96.5 °F (35.8 °C)-98.2 °F (36.8 °C)] 96.6 °F (35.9 °C)  Pulse:  [50-76] 58  Resp:  [10-22] 18  SpO2:  [94 %-100 %] 97 %  BP: ()/(51-85) 95/55      Weight: 69.4 kg (153 lb)  Body mass index is 26.26 kg/m².    Intake/Output - Last 3 Shifts       09/23 0700 - 09/24 0659 09/24 0700 - 09/25 0659 09/25 0700 - 09/26 0659    I.V. (mL/kg)  1300 (18.7)     Total Intake(mL/kg)  1300 (18.7)     Urine (mL/kg/hr)  1000     Drains  311     Total Output  1311     Net  -11                  Physical Exam   Constitutional: She is oriented to person, place, and time. She appears well-developed and well-nourished.   HENT:   Head: Normocephalic and atraumatic.   Eyes: Conjunctivae are normal. Pupils are equal, round, and reactive to light.   Neck: Normal range of motion. Neck supple.   Cardiovascular: Normal rate and regular rhythm.   Pulmonary/Chest: Effort normal. No respiratory distress.   Abdominal: Soft. She exhibits no distension.   Musculoskeletal: Normal range of motion. She exhibits no edema.   Neurological: She is alert and oriented to person, place, and time.   Skin: Skin is warm and dry.   ELENI drains to chest draining sanguinous fluid. Wound vacs in place to breasts bilaterally; good seal appreciated.   Psychiatric: She has a normal mood and affect. Her behavior is normal. Judgment normal.     Significant Labs:  CBC: No results for input(s): WBC, RBC, HGB, HCT, PLT, MCV, MCH, MCHC in the last 168 hours.  BMP: No results for input(s): GLU, NA, K, CL, CO2, BUN, CREATININE, CALCIUM, MG in the last 168 hours.    Significant Diagnostics:  I have reviewed all pertinent imaging results/findings within the past 24 hours.    Assessment/Plan:     Breast cancer    63F w/ new dx of R invasive ductal cancer with ER+[place]-, HER2-, and chronic left breast pain. Planned for bilateral nipple sparing mastectomies-now POD#1.    Plan:  -POD#1 from direct to implant reconstruction  -Continue cares per primary care team  -Will d/c on 10 day course of clindamycin, 300mg, tid  -continue current pain control regimen as inpatient  -continue wound vacs and drains; continue educating patient on  drain care                  Rayna Pineda MD  Plastic Surgery  [facility]      ##addendum##  Doing well.  DC if ok with general surgery. Ok from our standpoint  Should also go home on flexeril.   Needs to be changed to portable prevena vac units before DC.   lisa Ying MD  Plastic Surgery PGY-6

## 2018-09-26 NOTE — ANESTHESIA POSTPROCEDURE EVALUATION
"Anesthesia Post Evaluation    Patient: Ira Novoa    Procedure(s) Performed: Procedure(s) (LRB):  MASTECTOMY (Bilateral)  BIOPSY, LYMPH NODE, SENTINEL (Right)  INJECTION, FOR SENTINEL NODE IDENTIFICATION (Right)  INSERTION, BREAST IMPLANT (Bilateral)  INSERTION, TISSUE EXPANDER, BREAST (Bilateral)    Final Anesthesia Type: general  Patient location during evaluation: PACU  Patient participation: Yes- Able to Participate  Level of consciousness: awake and alert and oriented  Post-procedure vital signs: reviewed and stable  Pain management: adequate  Airway patency: patent  PONV status at discharge: No PONV  Anesthetic complications: no      Cardiovascular status: hemodynamically stable  Respiratory status: unassisted, spontaneous ventilation and room air  Hydration status: euvolemic  Follow-up not needed.        Visit Vitals  BP (!) 100/57 (Patient Position: Lying)   Pulse 76   Temp 36.1 °C (97 °F) (Oral)   Resp 18   Ht 5' 4" (1.626 m)   Wt 69.4 kg (153 lb)   SpO2 99%   Breastfeeding? No   BMI 26.26 kg/m²       Pain/Radha Score: Pain Assessment Performed: Yes (9/25/2018  2:00 PM)  Presence of Pain: denies (9/25/2018  2:00 PM)  Pain Rating Prior to Med Admin: 5 (9/25/2018 10:32 AM)  Pain Rating Post Med Admin: 0 (9/25/2018 11:32 AM)  Radha Score: 10 (9/24/2018  9:22 PM)        "

## 2018-09-27 ENCOUNTER — TELEPHONE (OUTPATIENT)
Dept: SURGERY | Facility: CLINIC | Age: 63
End: 2018-09-27

## 2018-09-27 ENCOUNTER — THERAPY VISIT (OUTPATIENT)
Dept: PLASTIC SURGERY | Facility: HOSPITAL | Age: 63
End: 2018-09-27

## 2018-09-27 DIAGNOSIS — C50.211 PRIMARY CANCER OF UPPER INNER QUADRANT OF RIGHT FEMALE BREAST: Primary | ICD-10-CM

## 2018-09-27 NOTE — TELEPHONE ENCOUNTER
----- Message from Sallie Palafox sent at 9/27/2018  4:05 PM CDT -----  Caller states he need to speak with nurse in regards to replacing a Preven plus wound unit.    Call [phone]

## 2018-09-27 NOTE — PROGRESS NOTES
Patient seen in Plastic Surgery clinic for bilateral prevena wound vacs malfunctioning. Right wound vac stopped working completely (battery stopped recharging), while left wound vac unable to hold suction with multiple attempts at patching.     Wound vacs removed in clinic. Vertical incisions c/d/i without erythema bilaterally. Skin edges coming together nicely. No drainage visualized from wounds.    Right breast; Non-stick gauze and abd pad placed over incision. ELENI drains x2 left in place as still having greater than 15 cc output per day.  Left breast; One steri-strip placed to small area of incision at inferior aspect of wound where edges not quite approximated. Non-stick gauze and abd pad placed over incision. ELENI drains x2 left in place as still having greater than 15 cc output per day.    Patient will follow-up in clinic on Monday at scheduled appointment time.    Rayna Pineda MD  Plastic Surgery, PGY1  [facility]

## 2018-09-27 NOTE — TELEPHONE ENCOUNTER
Called pt  back to confirm where to meet at 2nd floor general surgery, pt still in traffic leaving [place]. Staff notified. BOSTON SALINAS

## 2018-09-27 NOTE — TELEPHONE ENCOUNTER
Called x 3 and finally spoke with pt to confirm 1 surgical vac not working, MD notified. Instructed per Dr Wilde @ 4PM to come to main campus 2nd floor clinic now to be seen by resident with verbal understanding. LVM also on ID'd VM x 2. RITA, LPN

## 2018-10-01 ENCOUNTER — OFFICE VISIT (OUTPATIENT)
Dept: PLASTIC SURGERY | Facility: CLINIC | Age: 63
End: 2018-10-01
Payer: COMMERCIAL

## 2018-10-01 VITALS
WEIGHT: 142 LBS | DIASTOLIC BLOOD PRESSURE: 80 MMHG | HEIGHT: 64 IN | BODY MASS INDEX: 24.24 KG/M2 | HEART RATE: 86 BPM | TEMPERATURE: 98 F | SYSTOLIC BLOOD PRESSURE: 124 MMHG

## 2018-10-01 DIAGNOSIS — Z09 SURGERY FOLLOW-UP EXAMINATION: Primary | ICD-10-CM

## 2018-10-01 PROCEDURE — 99024 POSTOP FOLLOW-UP VISIT: CPT | Mod: S$GLB,,, | Performed by: SURGERY

## 2018-10-01 PROCEDURE — 99999 PR PBB SHADOW E&M-EST. PATIENT-LVL III: CPT | Mod: PBBFAC,,, | Performed by: SURGERY

## 2018-10-02 NOTE — PROGRESS NOTES
Ms. Novoa presents to Plastic Surgery Clinic after having a bilateral direct   implant breast reconstruction.  All incisions, everything looks fine.  Her   drains are functioning well.  We will see her in a week.      YANDEL/DAY  dd: 10/02/2018 18:22:55 (CDT)  td: 10/03/2018 02:11:26 (CDT)  Doc ID   #[id number]  Job ID #[id number]    CC:

## 2018-10-09 ENCOUNTER — OFFICE VISIT (OUTPATIENT)
Dept: SURGERY | Facility: CLINIC | Age: 63
End: 2018-10-09
Payer: COMMERCIAL

## 2018-10-09 VITALS
HEART RATE: 62 BPM | WEIGHT: 154 LBS | SYSTOLIC BLOOD PRESSURE: 122 MMHG | TEMPERATURE: 99 F | DIASTOLIC BLOOD PRESSURE: 74 MMHG | HEIGHT: 64 IN | BODY MASS INDEX: 26.29 KG/M2

## 2018-10-09 DIAGNOSIS — C50.211 PRIMARY CANCER OF UPPER INNER QUADRANT OF RIGHT FEMALE BREAST: Primary | ICD-10-CM

## 2018-10-09 PROCEDURE — 99999 PR PBB SHADOW E&M-EST. PATIENT-LVL III: CPT | Mod: PBBFAC,,, | Performed by: SURGERY

## 2018-10-09 PROCEDURE — 99024 POSTOP FOLLOW-UP VISIT: CPT | Mod: S$GLB,,, | Performed by: SURGERY

## 2018-10-09 NOTE — MEDICAL/APP STUDENT
Subjective:       Patient ID: Ira Novoa is a 63 y.o. female.    Chief Complaint: No chief complaint on file.    Ira Novoa is 63 y.o. Female with a PMH of hydrocephalus, had invasive ductal carcinoma s/p bilateral mastectomy and sentinel lymph node bx on 9/24, presented for a post-op evaluation.  She complains of breast pain bilaterally, aching, constant 3-4/10, started early this morning after she woke up.  She has been taking 1/2 of the tylenol pills since the surgery for pain control, and could not tolerate additional pain meds.  Pain was well controlled until today.  She denies fever, chill, sweating, N/V.  She denies any changes to her skin over the chest, no redness, no welling.  Her drains were intact and clean. ROM of her arms has been steadily improved since the surgery.       Initial pathology report showed ER+ [place]- with Her2 status pending, 1.5 cm margin, 0/4 lymph node involvement.       Review of Systems   Constitutional: Negative for chills, diaphoresis, fatigue, fever and unexpected weight change.   Eyes: Negative for photophobia and visual disturbance.   Respiratory: Negative for chest tightness and shortness of breath.    Cardiovascular: Negative for chest pain and palpitations.   Gastrointestinal: Negative for constipation, diarrhea, nausea and vomiting.   Genitourinary: Negative for dysuria, frequency and urgency.   Musculoskeletal: Negative for arthralgias and myalgias.   Skin: Negative for color change, pallor and rash.   Neurological: Negative for dizziness, tremors, weakness and numbness.   Hematological: Negative for adenopathy.         Past Medical History:   Diagnosis Date    Hydrocephalus       Past Surgical History:   Procedure Laterality Date    BIOPSY, LYMPH NODE, SENTINEL Right 9/24/2018    Performed by Edgar Eden MD at [facility] OR [address] [place] [address]    BRAIN SURGERY      INJECTION FOR SENTINEL NODE IDENTIFICATION Right 9/24/2018    Procedure: INJECTION, FOR SENTINEL NODE  IDENTIFICATION;  Surgeon: Edgar Eden MD;  Location: [facility] OR [address] [place] [address];  Service: General;  Laterality: Right;    INJECTION, FOR SENTINEL NODE IDENTIFICATION Right 9/24/2018    Performed by Edgar Eden MD at [facility] OR [address] [place] [address]    INSERTION OF BREAST IMPLANT Bilateral 9/24/2018    Procedure: INSERTION, BREAST IMPLANT;  Surgeon: Donnie Wilde MD;  Location: [facility] OR [address] [place] [address];  Service: Plastics;  Laterality: Bilateral;    INSERTION OF BREAST TISSUE EXPANDER Bilateral 9/24/2018    Procedure: INSERTION, TISSUE EXPANDER, BREAST;  Surgeon: Donnie Wilde MD;  Location: [facility] OR [address] [place] [address];  Service: Plastics;  Laterality: Bilateral;    INSERTION, BREAST IMPLANT Bilateral 9/24/2018    Performed by Donnie Wilde MD at [facility] OR [address] [place] [address]    INSERTION, TISSUE EXPANDER, BREAST Bilateral 9/24/2018    Performed by Donnie Wilde MD at [facility] OR [address] [place] [address]    MASTECTOMY Bilateral 9/24/2018    Procedure: MASTECTOMY;  Surgeon: Edgar Eden MD;  Location: [facility] OR [address] [place] [address];  Service: General;  Laterality: Bilateral;    MASTECTOMY Bilateral 9/24/2018    Performed by Edgar Eden MD at [facility] OR [address] [place] [address]    SENTINEL LYMPH NODE BIOPSY Right 9/24/2018    Procedure: BIOPSY, LYMPH NODE, SENTINEL;  Surgeon: Edgar Eden MD;  Location: [address];  Service: General;  Laterality: Right;      Current Outpatient Medications   Medication Sig Dispense Refill    esomeprazole magnesium (NEXIUM) 10 mg GrPS Take 10 mg by mouth before breakfast.      HYDROcodone-acetaminophen (NORCO) 5-325 mg per tablet Take 1 tablet by mouth every 6 (six) hours as needed for Pain. 30 tablet 0     No current facility-administered medications for this visit.       Social History     Socioeconomic History    Marital status:      Spouse name: Not on file    Number of children: Not on file    Years of education: Not on file    Highest education level: Not on file   Social Needs    Financial resource  strain: Not on file    Food insecurity - worry: Not on file    Food insecurity - inability: Not on file    Transportation needs - medical: Not on file    Transportation needs - non-medical: Not on file   Occupational History    Not on file   Tobacco Use    Smoking status: Never Smoker   Substance and Sexual Activity    Alcohol use: No    Drug use: Not on file    Sexual activity: Not on file   Other Topics Concern    Not on file   Social History Narrative    Not on file        Objective:      Physical Exam   Constitutional: She appears well-developed and well-nourished. No distress.   Eyes: EOM are normal. Pupils are equal, round, and reactive to light.   Neck: Normal range of motion.   Cardiovascular: Normal rate and regular rhythm.   Pulmonary/Chest: Effort normal and breath sounds normal.   Skin: She is not diaphoretic. No erythema. No pallor.   Surgical sites are dry and clean, drains are intact and ready to be removed.        Assessment:       Ira Novoa is a 63 y.o. female presented for post-op evaluation   Plan:       Removed the drain.   FISH results are still pending, which are needed to discuss further treatment options.   Pain control with additional ibuprofen if needed.

## 2018-10-09 NOTE — LETTER
[facility]  [address]  [place] [address]  Phone: [phone]  Fax: [phone] October 14, 2018      Evens Moreno III, MD  [address] [facility] [place] [address]    Patient: Ira Novoa   MR Number: [id number]   YOB: 1955   Date of Visit: 10/9/2018     Dear Dr. Moreno:    Thank you for referring Ira Novoa to me for evaluation. Attached you will find relevant portions of my assessment and plan of care.    Ira Novoa is a very pleasant 63-year-old  female who presents for her first initial two-week postop followup, status post bilateral mastectomies and right axillary sentinel lymph node biopsies on 09/24/2018 with immediate reconstruction by Dr. Wilde.  The patient had a history of hydrocephalus and had a  shunt in her midline, which was not encountered during the dissection.  She presents today with no subjective complaints.  The pathology was reviewed with her on the right side.  She ended up having a T1c 1.1 cm, ER positive, IN negative, HER-2/sri pending FISH, invasive cancer that was 1.5 cm from the closest margin.  She had 0 of 4 lymph nodes involved for tumor on the sentinel lymph node biopsy making this a T1c N0 stage IA tumor at the present time.  There are no signs of infection.  She had a direct implant reconstruction.  A copy of the pathology report was provided to the patient and discussed with her.    At this point, there will be no role for any adjuvant radiotherapy.  She will be recommended for adjuvant endocrine therapy.  She declines physical therapy at this point as she has good range of motion in both shoulders and upper extremities.  There is no evidence of lymphedema.    The patient with a T1c N0 FISH pending, estrogen receptor positive tumor on the right side from the right upper inner quadrant with margins greater than 1 cm.  We will refer her to Medical Oncology for consultation to discuss adjuvant  endocrine therapy and potential role for any adjuvant chemotherapy based on the results of her FISH.  We will present her at multidisciplinary breast cancer conference.  She will otherwise follow up with me in six months or sooner should she require central venous access for a port should her FISH result come back positive.  Otherwise, she will follow up with me in six months.  She was given a notice that she may return to work on Monday, 10/15/2018.    If you have questions, please do not hesitate to call me. I look forward to following Ira Novoa along with you.    Sincerely,      Edgar Eden MD  Medical Director, [facility]  Staff Attending Surgeon - Department of Surgery  [facility]  Associate Professor of Surgery  [employer]  [employer]    RLC/hcr    CC  Jovany Alvarado MD

## 2018-10-10 ENCOUNTER — OFFICE VISIT (OUTPATIENT)
Dept: PLASTIC SURGERY | Facility: CLINIC | Age: 63
End: 2018-10-10
Payer: COMMERCIAL

## 2018-10-10 VITALS
TEMPERATURE: 98 F | HEIGHT: 64 IN | BODY MASS INDEX: 26.67 KG/M2 | HEART RATE: 68 BPM | WEIGHT: 156.19 LBS | SYSTOLIC BLOOD PRESSURE: 107 MMHG | DIASTOLIC BLOOD PRESSURE: 76 MMHG

## 2018-10-10 DIAGNOSIS — Z09 SURGERY FOLLOW-UP EXAMINATION: Primary | ICD-10-CM

## 2018-10-10 PROCEDURE — 99999 PR PBB SHADOW E&M-EST. PATIENT-LVL III: CPT | Mod: PBBFAC,,, | Performed by: SURGERY

## 2018-10-10 PROCEDURE — 99024 POSTOP FOLLOW-UP VISIT: CPT | Mod: S$GLB,,, | Performed by: SURGERY

## 2018-10-10 NOTE — PROGRESS NOTES
Ms. Novoa presents to Plastic Surgery Clinic after having a bilateral direct   implant breast reconstruction on 9/24.  All incisions and drain sites looks good.  Her   drains are functioning well.  She has decreasing drain outputs.       Physical Exam    Vitals:    10/10/18 0911   BP: 107/76   Pulse: 68   Temp: 98.3 °F (36.8 °C)     NAD  Right cancerous breast is smaller than the left  Incision sites are without erythema  Drain sites are without erythema  Serosanguinous outputs from drains      Assessment and Plan   Ms Shafer is a 62 yo female 17 days status post bilateral direct implant breast reconstruction. She is doing well.     -She will have drains pulled in appointment in one week.   -She will likely need future procedure for fat transfer to make right breast more symmetrical with left.      Meek Claire MD

## 2018-10-14 NOTE — PROGRESS NOTES
Subjective:       Patient ID: Ira Novoa is a 63 y.o. female.     Chief Complaint: No chief complaint on file.     Iar Novoa is 63 y.o. Female with a PMH of hydrocephalus, had invasive ductal carcinoma s/p bilateral mastectomy and sentinel lymph node bx on 9/24, presented for a post-op evaluation.  She complains of breast pain bilaterally, aching, constant 3-4/10, started early this morning after she woke up.  She has been taking 1/2 of the tylenol pills since the surgery for pain control, and could not tolerate additional pain meds.  Pain was well controlled until today.  She denies fever, chill, sweating, N/V.  She denies any changes to her skin over the chest, no redness, no welling.  Her drains were intact and clean. ROM of her arms has been steadily improved since the surgery.        Initial pathology report showed ER+ [place]- with Her2 status pending, 1.5 cm margin, 0/4 lymph node involvement.         Review of Systems   Constitutional: Negative for chills, diaphoresis, fatigue, fever and unexpected weight change.   Eyes: Negative for photophobia and visual disturbance.   Respiratory: Negative for chest tightness and shortness of breath.    Cardiovascular: Negative for chest pain and palpitations.   Gastrointestinal: Negative for constipation, diarrhea, nausea and vomiting.   Genitourinary: Negative for dysuria, frequency and urgency.   Musculoskeletal: Negative for arthralgias and myalgias.   Skin: Negative for color change, pallor and rash.   Neurological: Negative for dizziness, tremors, weakness and numbness.   Hematological: Negative for adenopathy.               Past Medical History:   Diagnosis Date    Hydrocephalus              Past Surgical History:   Procedure Laterality Date    BIOPSY, LYMPH NODE, SENTINEL Right 9/24/2018     Performed by Edgar Eden MD at [facility] OR 2ND [facility]    BRAIN SURGERY        INJECTION FOR SENTINEL NODE IDENTIFICATION Right 9/24/2018     Procedure:  INJECTION, FOR SENTINEL NODE IDENTIFICATION;  Surgeon: Edgar Eden MD;  Location: [facility] OR [address] [place] [address];  Service: General;  Laterality: Right;    INJECTION, FOR SENTINEL NODE IDENTIFICATION Right 9/24/2018     Performed by Edgar Eden MD at [facility] OR [address] [place] [address]    INSERTION OF BREAST IMPLANT Bilateral 9/24/2018     Procedure: INSERTION, BREAST IMPLANT;  Surgeon: Donnie Wilde MD;  Location: [facility] OR [address] [place] [address];  Service: Plastics;  Laterality: Bilateral;    INSERTION OF BREAST TISSUE EXPANDER Bilateral 9/24/2018     Procedure: INSERTION, TISSUE EXPANDER, BREAST;  Surgeon: Donnie Wilde MD;  Location: [facility] OR [address] [place] [address];  Service: Plastics;  Laterality: Bilateral;    INSERTION, BREAST IMPLANT Bilateral 9/24/2018     Performed by Donnie Wilde MD at [facility] OR [address] [place] [address]    INSERTION, TISSUE EXPANDER, BREAST Bilateral 9/24/2018     Performed by Donnie Wilde MD at [facility] OR [address] [place] [address]    MASTECTOMY Bilateral 9/24/2018     Procedure: MASTECTOMY;  Surgeon: Edgar Eden MD;  Location: [facility] OR [address] [place] [address];  Service: General;  Laterality: Bilateral;    MASTECTOMY Bilateral 9/24/2018     Performed by Edgar Eden MD at [facility] OR [address] [place] [address]    SENTINEL LYMPH NODE BIOPSY Right 9/24/2018     Procedure: BIOPSY, LYMPH NODE, SENTINEL;  Surgeon: Edgar Eden MD;  Location: [facility] OR [address] [place] [address];  Service: General;  Laterality: Right;      Current Medications          Current Outpatient Medications   Medication Sig Dispense Refill    esomeprazole magnesium (NEXIUM) 10 mg GrPS Take 10 mg by mouth before breakfast.        HYDROcodone-acetaminophen (NORCO) 5-325 mg per tablet Take 1 tablet by mouth every 6 (six) hours as needed for Pain. 30 tablet 0      No current facility-administered medications for this visit.          Social History               Socioeconomic History    Marital status:        Spouse name: Not on file    Number of children: Not on file    Years of education: Not on  file    Highest education level: Not on file   Social Needs    Financial resource strain: Not on file    Food insecurity - worry: Not on file    Food insecurity - inability: Not on file    Transportation needs - medical: Not on file    Transportation needs - non-medical: Not on file   Occupational History    Not on file   Tobacco Use    Smoking status: Never Smoker   Substance and Sexual Activity    Alcohol use: No    Drug use: Not on file    Sexual activity: Not on file   Other Topics Concern    Not on file   Social History Narrative    Not on file            Objective:   Physical Exam   Constitutional: She appears well-developed and well-nourished. No distress.   Eyes: EOM are normal. Pupils are equal, round, and reactive to light.   Neck: Normal range of motion.   Cardiovascular: Normal rate and regular rhythm.   Pulmonary/Chest: Effort normal and breath sounds normal.   Skin: She is not diaphoretic. No erythema. No pallor.   Surgical sites are dry and clean, drains are intact and ready to be removed.        Assessment:       Ira Novoa is a 63 y.o. female presented for post-op evaluation   Plan:       Drains will be removed per Dr. Wilde's plastic surgery team.  FISH results are still pending, which are needed to discuss further treatment options.   Pain control with additional ibuprofen if needed.   Pt scheduled to see Dr. Alvarado for med onc consult on 10-23-18.     I have personally taken the history and examined this patient and agree with the student's note as stated above.  Job # [id number].

## 2018-10-15 ENCOUNTER — TELEPHONE (OUTPATIENT)
Dept: SURGERY | Facility: CLINIC | Age: 63
End: 2018-10-15

## 2018-10-15 NOTE — TELEPHONE ENCOUNTER
Called patient and left voicemail letting her know her HER2 FISH was negative, and to call me back with any questions or concerns. Left my callback number

## 2018-10-15 NOTE — PROGRESS NOTES
Ira Novoa is a very pleasant 63-year-old  female who presents   for her first initial two-week postop followup, status post bilateral   mastectomies and right axillary sentinel lymph node biopsies on 09/24/2018 with   immediate reconstruction by Dr. Wilde.  The patient had a history of   hydrocephalus and had a  shunt in her midline, which was not encountered   during the dissection.  She presents today with no subjective complaints.  The   pathology was reviewed with her on the right side.  She ended up having a T1c   1.1 cm, ER positive, KY negative, HER-2/sri pending FISH, invasive cancer that   was 1.5 cm from the closest margin.  She had 0 of 4 lymph nodes involved for   tumor on the sentinel lymph node biopsy making this a T1c N0 stage IA tumor at   the present time.  There are no signs of infection.  She had a direct implant   reconstruction.  A copy of the pathology report was provided to the patient and   discussed with her.  At this point, there will be no role for any adjuvant   radiotherapy.  She will be recommended for adjuvant endocrine therapy.  She   declines physical therapy at this point as she has good range of motion in both   shoulders and upper extremities.  There is no evidence of lymphedema.    The patient with a T1c N0 FISH pending, estrogen receptor positive tumor on the   right side from the right upper inner quadrant with margins greater than 1 cm.    We will refer her to Medical Oncology for consultation to discuss adjuvant   endocrine therapy and potential role for any adjuvant chemotherapy based on the   results of her FISH.  We will present her at multidisciplinary breast cancer   conference.  She will otherwise follow up with me in six months or sooner should   she require central venous access for a port should her FISH result come back   positive.  Otherwise, she will follow up with me in six months.  She was given a   notice that she may return to work on  Monday, 10/15/2018.      MATT/DAY  dd: 10/14/2018 08:32:19 (CDT)  td: 10/14/2018 13:01:26 (CDT)  Doc ID   #[id number]  Job ID #[id number]    CC:

## 2018-10-16 ENCOUNTER — TELEPHONE (OUTPATIENT)
Dept: HEMATOLOGY/ONCOLOGY | Facility: CLINIC | Age: 63
End: 2018-10-16

## 2018-10-16 ENCOUNTER — TELEPHONE (OUTPATIENT)
Dept: PLASTIC SURGERY | Facility: CLINIC | Age: 63
End: 2018-10-16

## 2018-10-16 NOTE — TELEPHONE ENCOUNTER
----- Message from Shravan Schafer sent at 10/16/2018  4:08 PM CDT -----  Contact: Pt  Rescheduling Apt    Who called: Pt  Original Apt: 10/23  Contact preference: [phone]  Additional Information: Would like an earlier appt that day if possible.

## 2018-10-16 NOTE — TELEPHONE ENCOUNTER
"Mrs Novoa called the plastic surgery clinic today with complaints of her drain tubing "disconnecting" from her body.  She also mentioned the drain no longer having any output.  I asked Mrs Novoa if she would like to come in today to be evaluated but she declined.  Pt stated she "has school today." Mrs. Novoa does have a regular follow up tomorrow and verified that she would be here. I asked her if there was anything draining from the site but she denied seeing anything in a 24 hour period. She "put a big bandaid" on the site and was asked to give us a call should anything change.  Pt agreed and will see us 10.16.18 at 9:45a  "

## 2018-10-17 ENCOUNTER — OFFICE VISIT (OUTPATIENT)
Dept: PLASTIC SURGERY | Facility: CLINIC | Age: 63
End: 2018-10-17
Payer: COMMERCIAL

## 2018-10-17 VITALS
DIASTOLIC BLOOD PRESSURE: 74 MMHG | HEIGHT: 64 IN | TEMPERATURE: 98 F | SYSTOLIC BLOOD PRESSURE: 114 MMHG | HEART RATE: 76 BPM | BODY MASS INDEX: 26.49 KG/M2 | WEIGHT: 155.13 LBS

## 2018-10-17 DIAGNOSIS — Z09 SURGERY FOLLOW-UP EXAMINATION: Primary | ICD-10-CM

## 2018-10-17 PROCEDURE — 99024 POSTOP FOLLOW-UP VISIT: CPT | Mod: S$GLB,,, | Performed by: SURGERY

## 2018-10-17 PROCEDURE — 99999 PR PBB SHADOW E&M-EST. PATIENT-LVL III: CPT | Mod: PBBFAC,,, | Performed by: SURGERY

## 2018-10-17 RX ORDER — PNEUMOCOCCAL 13-VALENT CONJUGATE VACCINE 2.2; 2.2; 2.2; 2.2; 2.2; 4.4; 2.2; 2.2; 2.2; 2.2; 2.2; 2.2; 2.2 UG/.5ML; UG/.5ML; UG/.5ML; UG/.5ML; UG/.5ML; UG/.5ML; UG/.5ML; UG/.5ML; UG/.5ML; UG/.5ML; UG/.5ML; UG/.5ML; UG/.5ML
INJECTION, SUSPENSION INTRAMUSCULAR
Refills: 0 | COMMUNITY
Start: 2018-09-12 | End: 2019-01-07 | Stop reason: CLARIF

## 2018-10-17 NOTE — PROGRESS NOTES
History & Physical  Plastic Surgery Clinic    SUBJECTIVE:     History of Present Illness:  Patient is a 63 y.o. female presents for follow up s/p bilateral direct implant reconstruction 3 weeks ago.      Right sided drain was accidentally pulled out 2 days ago.  Denies fevers, chills, nausea, emesis.  Per patient left sided drain output has been less than 5 cc per day for the last three days.  She denies drainage from incisions or drain site.  Denies erythema or significant pain.    Past Medical History:  Past Medical History:   Diagnosis Date    Hydrocephalus        Past Surgical History:  Past Surgical History:   Procedure Laterality Date    BIOPSY, LYMPH NODE, SENTINEL Right 9/24/2018    Performed by Edgar Eden MD at [facility] OR [address] [place] [address]    BRAIN SURGERY      INJECTION FOR SENTINEL NODE IDENTIFICATION Right 9/24/2018    Procedure: INJECTION, FOR SENTINEL NODE IDENTIFICATION;  Surgeon: Edgar Eden MD;  Location: [facility] OR [address] [place] [address];  Service: General;  Laterality: Right;    INJECTION, FOR SENTINEL NODE IDENTIFICATION Right 9/24/2018    Performed by Edgar Eden MD at [facility] OR [address] [place] [address]    INSERTION OF BREAST IMPLANT Bilateral 9/24/2018    Procedure: INSERTION, BREAST IMPLANT;  Surgeon: Donnie Wilde MD;  Location: [facility] OR [address] [place] [address];  Service: Plastics;  Laterality: Bilateral;    INSERTION OF BREAST TISSUE EXPANDER Bilateral 9/24/2018    Procedure: INSERTION, TISSUE EXPANDER, BREAST;  Surgeon: Donnie Wilde MD;  Location: [facility] OR [address] [place] [address];  Service: Plastics;  Laterality: Bilateral;    INSERTION, BREAST IMPLANT Bilateral 9/24/2018    Performed by [facility] Wilde MD at [facility] OR [address] [place] [address]    INSERTION, TISSUE EXPANDER, BREAST Bilateral 9/24/2018    Performed by [facility]de MD at [facility] OR [address] [place] [address]    MASTECTOMY Bilateral 9/24/2018    Procedure: MASTECTOMY;  Surgeon: Edgar Eden MD;  Location: [facility] OR [address] [place] [address];  Service: General;  Laterality: Bilateral;     "MASTECTOMY Bilateral 9/24/2018    Performed by Edgar Eden MD at [facility] OR [address] [place] [address]    SENTINEL LYMPH NODE BIOPSY Right 9/24/2018    Procedure: BIOPSY, LYMPH NODE, SENTINEL;  Surgeon: Edgar Eden MD;  Location: [facility] OR [address] [place] [address];  Service: General;  Laterality: Right;       Family History:  No family history on file.    Social History:  Social History     Socioeconomic History    Marital status:      Spouse name: None    Number of children: None    Years of education: None    Highest education level: None   Social Needs    Financial resource strain: None    Food insecurity - worry: None    Food insecurity - inability: None    Transportation needs - medical: None    Transportation needs - non-medical: None   Occupational History    None   Tobacco Use    Smoking status: Never Smoker   Substance and Sexual Activity    Alcohol use: No    Drug use: None    Sexual activity: None   Other Topics Concern    None   Social History Narrative    None       Medications:  Current Outpatient Medications   Medication Sig Dispense Refill    esomeprazole magnesium (NEXIUM) 10 mg GrPS Take 10 mg by mouth before breakfast.      HYDROcodone-acetaminophen (NORCO) 5-325 mg per tablet Take 1 tablet by mouth every 6 (six) hours as needed for Pain. 30 tablet 0    PREVNAR 13, PF, 0.5 mL Syrg ADM 0.5ML IM UTD  0     No current facility-administered medications for this visit.        Allergies:  Review of patient's allergies indicates:   Allergen Reactions    Penicillins Other (See Comments)     Yeast infection       Review of Systems:  Negative except for HPI.      OBJECTIVE:     /74   Pulse 76   Temp 98.3 °F (36.8 °C)   Ht 5' 4" (1.626 m)   Wt 70.4 kg (155 lb 1.5 oz)   BMI 26.62 kg/m²     Physical Exam:  Constitutional: Oriented to person, place, and time. Appears well-developed and well-nourished.   Right breast drain site is c/d/i, incision is also c/d/i steri strips removed.  Breast has no " evidence of erythema or induration.  Left breast drain with minimal s/s output.  Incisions are c/d/i, steri strips removed.  Breast has no evidence of erythema or induration.          ASSESSMENT/PLAN:     63 y.o. female with breast cancer s/p DI 3 weeks ago.     Overall doing well.  Right breast is overall smaller than left, and bilateral nipples would benefit from a small crescent lift.  Discussed these options with her and the appropriate time to perform the 2nd stage reconstruction in 6-8 weeks.      Discussed importance of close observation of bilateral breasts for erythema now that drains are out, follow up in two weeks or sooner if needed    JOSEF Lauren MD, [facility]  Plastic Surgery Fellow  .

## 2018-10-23 ENCOUNTER — OFFICE VISIT (OUTPATIENT)
Dept: SURGERY | Facility: CLINIC | Age: 63
End: 2018-10-23
Payer: COMMERCIAL

## 2018-10-23 ENCOUNTER — TUMOR BOARD CONFERENCE (OUTPATIENT)
Dept: SURGERY | Facility: CLINIC | Age: 63
End: 2018-10-23

## 2018-10-23 VITALS
SYSTOLIC BLOOD PRESSURE: 126 MMHG | RESPIRATION RATE: 16 BRPM | BODY MASS INDEX: 26.08 KG/M2 | WEIGHT: 152.75 LBS | HEART RATE: 66 BPM | TEMPERATURE: 98 F | DIASTOLIC BLOOD PRESSURE: 79 MMHG | HEIGHT: 64 IN

## 2018-10-23 DIAGNOSIS — C50.211 PRIMARY CANCER OF UPPER INNER QUADRANT OF RIGHT FEMALE BREAST: Primary | ICD-10-CM

## 2018-10-23 DIAGNOSIS — Z86.718 HISTORY OF DVT IN ADULTHOOD: ICD-10-CM

## 2018-10-23 PROCEDURE — 99999 PR PBB SHADOW E&M-EST. PATIENT-LVL III: CPT | Mod: PBBFAC,,, | Performed by: INTERNAL MEDICINE

## 2018-10-23 PROCEDURE — 99205 OFFICE O/P NEW HI 60 MIN: CPT | Mod: S$GLB,,, | Performed by: INTERNAL MEDICINE

## 2018-10-23 PROCEDURE — 3008F BODY MASS INDEX DOCD: CPT | Mod: CPTII,S$GLB,, | Performed by: INTERNAL MEDICINE

## 2018-10-23 NOTE — PROGRESS NOTES
Subjective:       Patient ID: Ira Novoa is a 63 y.o. female.    Chief Complaint: No chief complaint on file.    HPI     REFERRING PHYSICIAN:  Edgar Eden M.D.    REASON FOR REFERRAL:  Recent diagnosis of breast cancer, consideration for   adjuvant treatment.    HISTORY OF PRESENT ILLNESS:  Mrs. Novoa is a 63-year-old postmenopausal    female who was initially referred in mid August for evaluation of   recently diagnosed carcinoma of the right breast.  The patient had an abnormal   mammogram in late July 2018.  Interestingly, the mammogram was done for pain in   the left breast; however, there was a 5 mm hypoechoic mass that showed up in the   right breast.  The patient underwent a biopsy that showed a carcinoma that was   ER strongly positive, OR negative and HER-2 negative by FISH.  She was seen by   Dr. Eden and on 09/24/2018, she underwent bilateral mastectomies, sentinel   lymph node biopsy and insertion of prosthesis bilaterally.  The pathology report   from the procedure indicates that she had a grade I carcinoma that was ER   strongly positive, OR negative, and HER-2 negative.  Resection margins were   clear, while the sentinel lymph node was negative.  An Oncotype has been sent;   however, results are not currently available.  She is referred for evaluation.    PAST MEDICAL HISTORY:  As above.  She has a history of a  shunt placed   initially when she was a child and has undergone more than 15 revisions since   then.  The last time the  shunt was revised was approximately five years ago   and that procedure was complicated by a left lower extremity deep venous   thrombosis and a subsequent PE.  The patient was treated with anticoagulation   and also had an IVC filter placed.  She has been off of anticoagulation for   several years now.    SOCIAL HISTORY:  She is  and works as a teacher.  She used to smoke in   college, but no longer smokes.  She does not drink  alcohol.    FAMILY HISTORY:  Two cousins have been diagnosed with colon cancer and gastric   cancer, respectively.    GYN HISTORY:  Menarche was at 11, first live birth was at 31 and she went into   menopause at 56.  No estrogen replacement therapy.        Review of Systems    Overall she feels well. She is still recuperating from her surgery and states that she feels sore.  She denies any anxiety, depression, easy bruising, fevers, chills, night  sweats, weight loss, nausea, vomiting, diarrhea, constipation, diplopia,     blurred vision, headache, chest pain, palpitations, shortness of breath, abdominal pain, extremity pain, or difficulty ambulating.  The remainder of the ten-point ROS, including general, skin, lymph, H/N, cardiorespiratory, GI, , Neuro, Endocrine, and psychiatric is negative.     Objective:      Physical Exam      She is alert, oriented to time, place, person, pleasant, well      nourished, in no acute physical distress.                                    VITAL SIGNS:  Reviewed                                      HEENT:  Normal.  There are no nasal, oral, lip, gingival, auricular, lid,    or conjunctival lesions.  Mucosae are moist and pink, and there is no        thrush.  Pupils are equal, reactive to light and accommodation.              Extraocular muscle movements are intact.  Dentition is good.  There is no frontal or maxillary tenderness.                                     NECK:  Supple without JVD, adenopathy, or thyromegaly. A scar of a partial thyroidectomy is seen.                      LUNGS:  Clear to auscultation without wheezing, rales, or rhonchi.           CARDIOVASCULAR:  Reveals an S1, S2, no murmurs, no rubs, no gallops.         ABDOMEN:  Soft, nontender, without organomegaly.  Bowel sounds are    present.    Multiple scars from her  Her previous shunt placements are seen.                                                               EXTREMITIES:  No cyanosis, clubbing, or  edema.                               BREASTS:  She is status post bialteral nipple sparing mastetomies with prostheses in place.  The reconstructed breasts are still sore but her incisions are healing nicely.   There are no masses in either breast.     LYMPHATIC:  There is no cervical, axillary, or supraclavicular adenopathy.   SKIN:  Warm and moist, without petechiae, rashes, induration, or ecchymoses.           NEUROLOGIC:  DTRs are 0-1+ bilaterally, symmetrical, motor function is 5/5,  and cranial nerves are  within normal limits.    Assessment:       1. Primary cancer of upper inner quadrant of right female breast      2.    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  3.      History of hydrocephalus s/p multiple  shunt revisions.   Plan:        I had a long discussion with her; she had a 1.1 cm grade 1 stage 1 tumor and she is s/p mastectomy.  She will not need XRT, and she would benefit from adjuvant hormonal therapy with anastrazole.  Whether she would benefit from chemotherapy can be determined based on her Oncotype score. I suspect that her oncotype score will be low give the low grade of her tumor.  The Oncotype has been sent already; I will bring her back in 2 weeks to discuss the results.  In regards to her prior history of LLE DVT and subsequent PE, it appears to have been been triggered by her latest  shunt revision and according to her sister she was non ambulatory.  I do not consider this a contraindication to AIs, even though I told her that with a prior history of thrombosis, her risk of a subsequent thrombosis while on AIs is probably higher.  She voiced understanding.  Her multiple questions were answered to her satisfaction.  I spent approximately 60 minutes reviewing the available records and evaluating the patient, out of which over 50% of the time was spent face to face with the patient in counseling and coordinating this patient's care.  RTC  11/7/2018.

## 2018-11-01 ENCOUNTER — TELEPHONE (OUTPATIENT)
Dept: HEMATOLOGY/ONCOLOGY | Facility: CLINIC | Age: 63
End: 2018-11-01

## 2018-11-01 NOTE — TELEPHONE ENCOUNTER
Spoke with patient to discuss reschedule. Patient willing to come the following week to see Dr. Castro. Explained that per oncotype score patient will not require chemotherapy.   Patient confirmed appointment next week set to Tuesday nov 13th at 940.

## 2018-11-01 NOTE — TELEPHONE ENCOUNTER
----- Message from Shea Wolff sent at 11/1/2018  3:01 PM CDT -----  Contact: Pt  Patient Returning Call from [facility]    Who Left Message for Patient:jere bonillaon  Communication Preference:[phone]  Additional Information:  Thank You  DELIA Wolff

## 2018-11-01 NOTE — TELEPHONE ENCOUNTER
----- Message from Chelsi Griffith sent at 11/1/2018  3:23 PM CDT -----  Contact: Pt  He will only be in clinic 11/7 to see the 12:40pm, I had to reschedule all the other patients   He also is not in clinic on 11/9 and I had to reschedule those.   ----- Message -----  From: Jazmine Nair RN  Sent: 11/1/2018   3:22 PM  To: Chelsi Griffith    Where is dr. castro going to be on 11/7?  When did this happen.. He didn't tell me      ----- Message -----  From: Chelsi Griffith  Sent: 11/1/2018   3:19 PM  To: Jenny Manzo Staff    Called and spoke with patient in regards to her apt on 11/7 and asked if she could come in on 11/6 or 11/8. Patient stated she already has an apt on 11/7 and can not come in 2 days in 1 week. I told her that since her apt was to received results. I would speak with Dr. Castro and get back wit her to see what he would like to do. She voiced appreciation    Please advise    ----- Message -----  From: Shea Wolff  Sent: 11/1/2018   3:01 PM  To: Chelsi Griffith    Patient Returning Call from [facility]    Who Left Message for Patient:chelsi griffith  Communication Preference:[phone]  Additional Information:  Thank You  DELIA Wolff

## 2018-11-01 NOTE — TELEPHONE ENCOUNTER
Called and spoke with patient in regards to her apt on 11/7 and asked if she could come in on 11/6 or 11/8. Patient stated she already has an apt on 11/7 and can not come in 2 days in 1 week. I told her that since her apt was to received results. I would speak with Dr. Castro and get back wit her to see what he would like to do. She voiced appreciation

## 2018-11-07 ENCOUNTER — OFFICE VISIT (OUTPATIENT)
Dept: PLASTIC SURGERY | Facility: CLINIC | Age: 63
End: 2018-11-07
Payer: COMMERCIAL

## 2018-11-07 VITALS
HEIGHT: 64 IN | SYSTOLIC BLOOD PRESSURE: 121 MMHG | DIASTOLIC BLOOD PRESSURE: 84 MMHG | HEART RATE: 73 BPM | TEMPERATURE: 98 F | BODY MASS INDEX: 26.44 KG/M2 | WEIGHT: 154.88 LBS

## 2018-11-07 DIAGNOSIS — Z09 SURGERY FOLLOW-UP EXAMINATION: Primary | ICD-10-CM

## 2018-11-07 PROCEDURE — 99024 POSTOP FOLLOW-UP VISIT: CPT | Mod: S$GLB,,, | Performed by: SURGERY

## 2018-11-07 PROCEDURE — 99999 PR PBB SHADOW E&M-EST. PATIENT-LVL III: CPT | Mod: PBBFAC,,, | Performed by: SURGERY

## 2018-11-07 PROCEDURE — 3008F BODY MASS INDEX DOCD: CPT | Mod: CPTII,S$GLB,, | Performed by: SURGERY

## 2018-11-07 NOTE — PROGRESS NOTES
History & Physical  Plastic Surgery Clinic    SUBJECTIVE:     History of Present Illness:  Patient is a 63 y.o. female presents for follow up s/p bilateral direct implant reconstruction on 9/24/18. Patient reports she would like to have her left nipple lifted to match the right and would like to move forward with free fat grafting. Patient reports she has occasional pain of the left breast. Pain increased with use of supportive bra.  Denies fevers, chills, nausea, emesis, or erythema.    Past Medical History:  Past Medical History:   Diagnosis Date    Hydrocephalus        Past Surgical History:  Past Surgical History:   Procedure Laterality Date    BIOPSY, LYMPH NODE, SENTINEL Right 9/24/2018    Performed by Edgar Eden MD at [facility] OR [address] [place] [address]    BRAIN SURGERY      CHOLECYSTECTOMY      INJECTION FOR SENTINEL NODE IDENTIFICATION Right 9/24/2018    Procedure: INJECTION, FOR SENTINEL NODE IDENTIFICATION;  Surgeon: Edgar Eden MD;  Location: [facility] OR [address] [place] [address];  Service: General;  Laterality: Right;    INJECTION, FOR SENTINEL NODE IDENTIFICATION Right 9/24/2018    Performed by Edgar Eden MD at [facility] OR [address] [place] [address]    INSERTION OF BREAST IMPLANT Bilateral 9/24/2018    Procedure: INSERTION, BREAST IMPLANT;  Surgeon: Donnie Wilde MD;  Location: [address];  Service: Plastics;  Laterality: Bilateral;    INSERTION OF BREAST TISSUE EXPANDER Bilateral 9/24/2018    Procedure: INSERTION, TISSUE EXPANDER, BREAST;  Surgeon: Donnie Wilde MD;  Location: [facility] OR [address] [place] [address];  Service: Plastics;  Laterality: Bilateral;    INSERTION, BREAST IMPLANT Bilateral 9/24/2018    Performed by Donnie Wilde MD at [facility] OR [address] [place] [address]    INSERTION, TISSUE EXPANDER, BREAST Bilateral 9/24/2018    Performed by Donnie Wilde MD at [facility] OR [address] [place] [address]    MASTECTOMY Bilateral 9/24/2018    Procedure: MASTECTOMY;  Surgeon: Edgar Eden MD;  Location: [address];  Service: General;   "Laterality: Bilateral;    MASTECTOMY Bilateral 9/24/2018    Performed by [facility] MD at [facility] OR [address] [place] [address]    SENTINEL LYMPH NODE BIOPSY Right 9/24/2018    Procedure: BIOPSY, LYMPH NODE, SENTINEL;  Surgeon: Edgar Eden MD;  Location: [facility] OR [address] [place] [address];  Service: General;  Laterality: Right;       Family History:  History reviewed. No pertinent family history.    Social History:  Social History     Socioeconomic History    Marital status:      Spouse name: None    Number of children: None    Years of education: None    Highest education level: None   Social Needs    Financial resource strain: None    Food insecurity - worry: None    Food insecurity - inability: None    Transportation needs - medical: None    Transportation needs - non-medical: None   Occupational History    None   Tobacco Use    Smoking status: Never Smoker   Substance and Sexual Activity    Alcohol use: No    Drug use: None    Sexual activity: None   Other Topics Concern    None   Social History Narrative    None       Medications:  Current Outpatient Medications   Medication Sig Dispense Refill    AFLURIA QUAD [id number], PF, 60 mcg/0.5 mL vaccine ADM 0.5ML IM UTD  0    esomeprazole magnesium (NEXIUM) 10 mg GrPS Take 10 mg by mouth before breakfast.      PREVNAR 13, PF, 0.5 mL Syrg ADM 0.5ML IM UTD  0     No current facility-administered medications for this visit.        Allergies:  Review of patient's allergies indicates:   Allergen Reactions    Penicillins Other (See Comments)     Yeast infection       Review of Systems:  Negative except for HPI.      OBJECTIVE:     /84   Pulse 73   Temp 98 °F (36.7 °C)   Ht 5' 4" (1.626 m)   Wt 70.2 kg (154 lb 14 oz)   BMI 26.58 kg/m²     Physical Exam:  Constitutional: Oriented to person, place, and time. Appears well-developed and well-nourished.   Right Breast has no evidence of erythema or swelling.  Left breast has no evidence of erythema or " swelling.        ASSESSMENT/PLAN:     63 y.o. female with breast cancer s/p DI 6 weeks ago.   - Right breast is overall smaller than left, and left nipple would benefit from a small crescent lift.   - Schedule for elevation of left nipple and free fat grafting to fill breast

## 2018-11-12 NOTE — PROGRESS NOTES
Subjective:       Patient ID: Ira Novoa is a 63 y.o. female.    Chief Complaint: No chief complaint on file.    HPI     Mrs. Novoa returns today for follow up.  Briefly, she is a 63-year-old postmenopausal  female who on 09/24/2018, underwent bilateral mastectomies, sentinel lymph node biopsy and insertion of prostheses bilaterally.  The pathology report from the procedure indicates that she had a grade I 1.1 cm carcinoma that was ER strongly positive, IL negative, and HER-2 negative.  Resection margins were clear, while the sentinel lymph node was negative.      An Oncotype has been sent, and her score is 14, indicating that she has a 9 % chance of metastases at ten years with hormonal therapy alone.      Review of Systems    Overall she feels well. She complains of mild persistent discomfort / pain in the left reconstructed breast, laterally.  These symptoms predated her bilateral mastectomies.  She denies any anxiety, depression, easy bruising, fevers, chills, night  sweats, weight loss, nausea, vomiting, diarrhea, constipation, diplopia, blurred vision, headache, chest pain, palpitations, shortness of breath, abdominal pain, extremity pain, or difficulty ambulating.  The remainder of the ten-point ROS, including general, skin, lymph, H/N, cardiorespiratory, GI, , Neuro, Endocrine, and psychiatric is negative.     Objective:      Physical Exam      She is alert, oriented to time, place, person, pleasant, well      nourished, in no acute physical distress.                                    VITAL SIGNS:  Reviewed                                      HEENT:  Normal.  There are no nasal, oral, lip, gingival, auricular, lid,    or conjunctival lesions.  Mucosae are moist and pink, and there is no        thrush.  Pupils are equal, reactive to light and accommodation.              Extraocular muscle movements are intact.  Dentition is good.  There is no frontal or maxillary tenderness.                                      NECK:  Supple without JVD, adenopathy, or thyromegaly. A scar of a partial thyroidectomy is seen.                      LUNGS:  Clear to auscultation without wheezing, rales, or rhonchi.           CARDIOVASCULAR:  Reveals an S1, S2, no murmurs, no rubs, no gallops.         ABDOMEN:  Soft, nontender, without organomegaly.  Bowel sounds are    present.    Multiple scars from her  Her previous shunt placements are seen.                                                               EXTREMITIES:  No cyanosis, clubbing, or edema.                               BREASTS:  She is status post bialteral nipple sparing mastetomies with prostheses in place.  I do not palpated any masses in the left reconstructed breast laterally.  There are no masses in ehe right reconstructed breast.     LYMPHATIC:  There is no cervical, axillary, or supraclavicular adenopathy.   SKIN:  Warm and moist, without petechiae, rashes, induration, or ecchymoses.           NEUROLOGIC:  DTRs are 0-1+ bilaterally, symmetrical, motor function is 5/5,  and cranial nerves are  within normal limits.    Assessment:       1. 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 History of DVT in adulthood      2.    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  3.      History of hydrocephalus s/p multiple  shunt revisions.   Plan:        I had a long discussion with her; she had a 1.1 cm grade 1 stage 1 tumor and she is s/p mastectomy.  Givern the low oncotype score, tehre is no benefit from chemotherapy, and I told her so.  The pros and cons of adjuvant hormonal therapy with anastrazole were discussed in detail; she was given a prescription for anastrazole 1 mg tablets with instructions to take one per day, and she will return to see me in one month with a DXA scan.    We had a long discussion about her prior thrombosis; I think that the benefits of anastrazole far outweigh the risks  of a thrombosis.  Finally, in regards to her left sided rib pain, / breast pain, if symptoms do not improve, we will obtain a bone scan at the time of her next visit.  RTC 4-5 weeks with a DXA scan  Her questions were answered to her satisfaction.

## 2018-11-13 ENCOUNTER — OFFICE VISIT (OUTPATIENT)
Dept: HEMATOLOGY/ONCOLOGY | Facility: CLINIC | Age: 63
End: 2018-11-13
Payer: COMMERCIAL

## 2018-11-13 VITALS
HEIGHT: 64 IN | DIASTOLIC BLOOD PRESSURE: 79 MMHG | RESPIRATION RATE: 16 BRPM | SYSTOLIC BLOOD PRESSURE: 128 MMHG | HEART RATE: 85 BPM | BODY MASS INDEX: 26.34 KG/M2 | TEMPERATURE: 98 F | WEIGHT: 154.31 LBS | OXYGEN SATURATION: 99 %

## 2018-11-13 DIAGNOSIS — T38.6X5A OSTEOPOROSIS DUE TO AROMATASE INHIBITOR: ICD-10-CM

## 2018-11-13 DIAGNOSIS — Z86.718 HISTORY OF DVT IN ADULTHOOD: ICD-10-CM

## 2018-11-13 DIAGNOSIS — C50.211 PRIMARY CANCER OF UPPER INNER QUADRANT OF RIGHT FEMALE BREAST: Primary | ICD-10-CM

## 2018-11-13 DIAGNOSIS — M81.8 OSTEOPOROSIS DUE TO AROMATASE INHIBITOR: ICD-10-CM

## 2018-11-13 PROCEDURE — 3008F BODY MASS INDEX DOCD: CPT | Mod: CPTII,S$GLB,, | Performed by: INTERNAL MEDICINE

## 2018-11-13 PROCEDURE — 99214 OFFICE O/P EST MOD 30 MIN: CPT | Mod: S$GLB,,, | Performed by: INTERNAL MEDICINE

## 2018-11-13 PROCEDURE — 99999 PR PBB SHADOW E&M-EST. PATIENT-LVL III: CPT | Mod: PBBFAC,,, | Performed by: INTERNAL MEDICINE

## 2018-11-13 RX ORDER — ANASTROZOLE 1 MG/1
1 TABLET ORAL DAILY
Qty: 90 TABLET | Refills: 2 | Status: SHIPPED | OUTPATIENT
Start: 2018-11-13 | End: 2019-01-07 | Stop reason: CLARIF

## 2019-01-02 DIAGNOSIS — Z85.3 PERSONAL HISTORY OF MALIGNANT NEOPLASM OF BREAST: Primary | ICD-10-CM

## 2019-01-03 ENCOUNTER — TELEPHONE (OUTPATIENT)
Dept: PLASTIC SURGERY | Facility: CLINIC | Age: 64
End: 2019-01-03

## 2019-01-03 NOTE — TELEPHONE ENCOUNTER
On 1.3.2019 at 9:47a, Mrs. Novoa appeared to the [email or web address] for preoperative education.  We reviewed and updated his allergy and current medication list.  We went over preparation for surgery from this point to his first post operative appointment.  Mrs. Novoa was given my direct telephone number and advised to call if any questions or concerns arise.

## 2019-01-07 ENCOUNTER — ANESTHESIA EVENT (OUTPATIENT)
Dept: SURGERY | Facility: HOSPITAL | Age: 64
End: 2019-01-07
Payer: COMMERCIAL

## 2019-01-07 DIAGNOSIS — Z01.818 PRE-OP TESTING: Primary | ICD-10-CM

## 2019-01-07 NOTE — ANESTHESIA PREPROCEDURE EVALUATION
Katina Sunshine RN   Registered Nurse      Pre Admission Screening   Signed                      Anesthesia Assessment: Preoperative EQUATION     Planned Procedure: Procedure(s) (LRB):  BREAST REVISION SURGERY (Bilateral)  RECONSTRUCTION, BREAST FAT TRANSFER RIGHT BREAST (Right)  RECONSTRUCTION, NIPPLE CRESCENT LIFT LEFT NIPPLE (Left)  Requested Anesthesia Type:General  Surgeon: Donnie Wilde MD  Service: Plastics  Known or anticipated Date of Surgery:1/17/2019  Optimization:  Anesthesia Preop Clinic Assessment  Indicated  Plan:    Testing:  Hemoglobin  Navigation: Tests Scheduled.                                            Results will be tracked by Preop Clinic.                                                 No anesthesia preop clinic visit, or consult required.                                Electronically signed by Katina Sunshine RN at 1/8/2019  9:34 AM                                                                                                       [facility]  Anesthesia Pre-Operative Evaluation         Patient Name: Ira Novoa  YOB: 1955  MRN: [id number]    SUBJECTIVE:     Pre-operative evaluation for Procedure(s) (LRB):  BREAST REVISION SURGERY (Bilateral)  RECONSTRUCTION, BREAST FAT TRANSFER RIGHT BREAST (Right)  RECONSTRUCTION, NIPPLE CRESCENT LIFT LEFT NIPPLE (Left)     01/16/2019    Ira Novoa is a 63 y.o. female w/ a significant PMHx of hydrocephalus s/p  shunt placement with multiple revisions, h/o DVT and PE s/p IVC filter 5 years ago, right breast carcinoma s/p bilateral mastectomies with bilateral direct implant reconstruction on 9/24/18 who now presents for the above procedure.         LDA: None documented.    Prev airway: None documented.    Drips: None documented.    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     Current Outpatient Medications:     esomeprazole magnesium (NEXIUM) 10 mg GrPS, Take 10 mg by mouth before breakfast., Disp: , Rfl:     Past Surgical History:   Procedure Laterality Date    BIOPSY, LYMPH NODE, SENTINEL Right 9/24/2018    [facility]    BRAIN SURGERY      CHOLECYSTECTOMY      INJECTION, FOR SENTINEL NODE IDENTIFICATION Right 9/24/2018    Performed by [facility] at [facility]    INSERTION, BREAST IMPLANT Bilateral 9/24/2018    Performed by [facility] at [facility]    INSERTION, TISSUE EXPANDER, BREAST Bilateral 9/24/2018    Performed by [facility] at [facility]    MASTECTOMY Bilateral 9/24/2018    [facility]       Social History     Socioeconomic History    Marital status:      Spouse name: Not on file    Number of children: Not on file    Years of education: Not on file    Highest education level: Not on file   Social Needs    Financial resource strain: Not on file    Food insecurity - worry: Not on file    Food insecurity - inability: Not on file    Transportation needs - medical: Not on file    Transportation needs - non-medical: Not on file   Occupational History    Not on file   Tobacco Use    Smoking status: Never Smoker   Substance and Sexual Activity    Alcohol use: No    Drug use: Not on file    Sexual activity: Not on file   Other Topics Concern    Not on file   Social History Narrative    Not on file       OBJECTIVE:     Vital Signs Range (Last 24H):         Significant Labs:  Lab Results   Component Value Date    WBC 7.57 02/01/2018    HGB 12.4 01/08/2019    HCT 42.0 02/01/2018      02/01/2018       Diagnostic Studies: No relevant studies.    EKG: No recent studies available.    2D ECHO:  No results found for this or any previous visit.      ASSESSMENT/PLAN:       Anesthesia Evaluation    I have reviewed the Patient Summary Reports.     I have reviewed the Medications.     Review of Systems  Anesthesia Hx:  No problems with previous Anesthesia  History of prior surgery of interest to airway management or planning:  Denies Personal Hx of Anesthesia complications.   Social:  Non-Smoker, No Alcohol Use    Hematology/Oncology:         -- Anemia: Current/Recent Cancer. Breast right surgery   EENT/Dental:EENT/Dental Normal   Cardiovascular:  Cardiovascular Normal Exercise tolerance: good     Pulmonary:  Pulmonary Normal    Renal/:  Renal/ Normal     Hepatic/GI:   GERD, well controlled    Musculoskeletal:  Musculoskeletal Normal    Neurological:  Neurology Normal    Endocrine:  Endocrine Normal    Psych:  Psychiatric Normal           Physical Exam  General:  Well nourished    Airway/Jaw/Neck:  Airway Findings: Mouth Opening: Normal Tongue: Normal  General Airway Assessment: Adult  Mallampati: II  Improves to II with phonation.  TM Distance: Normal, at least 6 cm         Dental:  Dental Findings: In tact   Chest/Lungs:  Chest/Lungs Findings: Clear to auscultation, Normal Respiratory Rate     Heart/Vascular:  Heart Findings: Rate: Normal  Rhythm: Regular Rhythm  Sounds: Normal  Heart murmur: negative       Mental Status:  Mental Status Findings:  Cooperative, Alert and Oriented     JONATHAN SEWELL 1/8/19    Anesthesia Plan  Type of Anesthesia, risks & benefits discussed:  Anesthesia Type:  general  Patient's Preference:   Intra-op Monitoring Plan: standard ASA monitors  Intra-op Monitoring Plan Comments:   Post Op Pain Control Plan: per primary service following discharge from PACU, IV/PO Opioids PRN and multimodal analgesia  Post Op Pain Control Plan Comments:   Induction:   IV  Beta Blocker:  Patient  is not currently on a Beta-Blocker (No further documentation required).       Informed Consent: Patient understands risks and agrees with Anesthesia plan.  Questions answered. Anesthesia consent signed with patient.  ASA Score: 2     Day of Surgery Review of History & Physical:    H&P update referred to the surgeon.         Ready For Surgery From Anesthesia Perspective.     1/9/19   Hgb results review, no further escalation indicated.  Alina Morales RN BSN

## 2019-01-08 ENCOUNTER — LAB VISIT (OUTPATIENT)
Dept: LAB | Facility: HOSPITAL | Age: 64
End: 2019-01-08
Attending: ANESTHESIOLOGY
Payer: COMMERCIAL

## 2019-01-08 DIAGNOSIS — Z01.818 PRE-OP TESTING: ICD-10-CM

## 2019-01-08 LAB — HGB BLD-MCNC: 12.4 G/DL

## 2019-01-08 PROCEDURE — 36415 COLL VENOUS BLD VENIPUNCTURE: CPT | Mod: PO,ER

## 2019-01-08 PROCEDURE — 85018 HEMOGLOBIN: CPT | Mod: PO,ER

## 2019-01-08 NOTE — PRE ADMISSION SCREENING
Anesthesia Assessment: Preoperative EQUATION    Planned Procedure: Procedure(s) (LRB):  BREAST REVISION SURGERY (Bilateral)  RECONSTRUCTION, BREAST FAT TRANSFER RIGHT BREAST (Right)  RECONSTRUCTION, NIPPLE CRESCENT LIFT LEFT NIPPLE (Left)  Requested Anesthesia Type:General  Surgeon: Donnie Wilde MD  Service: Plastics  Known or anticipated Date of Surgery:1/17/2019  Optimization:  Anesthesia Preop Clinic Assessment  Indicated  Plan:    Testing:  Hemoglobin  Navigation: Tests Scheduled.                        Results will be tracked by Preop Clinic.                            No anesthesia preop clinic visit, or consult required.

## 2019-01-16 ENCOUNTER — TELEPHONE (OUTPATIENT)
Dept: PLASTIC SURGERY | Facility: CLINIC | Age: 64
End: 2019-01-16

## 2019-01-16 NOTE — TELEPHONE ENCOUNTER
I contacted Mrs Shafer regarding her upcoming procedure.  She is scheduled to arrive on the 2nd floor, [facility] for 5:30a.  Pt verbalized understanding.

## 2019-01-17 ENCOUNTER — ANESTHESIA (OUTPATIENT)
Dept: SURGERY | Facility: HOSPITAL | Age: 64
End: 2019-01-17
Payer: COMMERCIAL

## 2019-01-17 ENCOUNTER — HOSPITAL ENCOUNTER (OUTPATIENT)
Facility: HOSPITAL | Age: 64
Discharge: HOME | End: 2019-01-17
Attending: SURGERY | Admitting: SURGERY
Payer: COMMERCIAL

## 2019-01-17 VITALS
OXYGEN SATURATION: 96 % | DIASTOLIC BLOOD PRESSURE: 67 MMHG | WEIGHT: 151 LBS | RESPIRATION RATE: 16 BRPM | HEART RATE: 56 BPM | HEIGHT: 64 IN | TEMPERATURE: 98 F | SYSTOLIC BLOOD PRESSURE: 103 MMHG | BODY MASS INDEX: 25.78 KG/M2

## 2019-01-17 DIAGNOSIS — Z85.3 PERSONAL HISTORY OF BREAST CANCER: Primary | ICD-10-CM

## 2019-01-17 PROCEDURE — D9220A PRA ANESTHESIA: Mod: ,,, | Performed by: ANESTHESIOLOGY

## 2019-01-17 PROCEDURE — 71000039 HC RECOVERY, EACH ADD'L HOUR: Performed by: SURGERY

## 2019-01-17 PROCEDURE — 20926 PR REMV TISSUE FOR GRAFT OTHR: ICD-10-PCS | Mod: 51,,, | Performed by: SURGERY

## 2019-01-17 PROCEDURE — 25000003 PHARM REV CODE 250

## 2019-01-17 PROCEDURE — 27000221 HC OXYGEN, UP TO 24 HOURS

## 2019-01-17 PROCEDURE — 63600175 PHARM REV CODE 636 W HCPCS: Performed by: SURGERY

## 2019-01-17 PROCEDURE — 25000003 PHARM REV CODE 250: Performed by: SURGERY

## 2019-01-17 PROCEDURE — 88305 TISSUE SPECIMEN TO PATHOLOGY - SURGERY: ICD-10-PCS | Mod: 26,,, | Performed by: PATHOLOGY

## 2019-01-17 PROCEDURE — S0077 INJECTION, CLINDAMYCIN PHOSP: HCPCS | Performed by: SURGERY

## 2019-01-17 PROCEDURE — 71000044 HC DOSC ROUTINE RECOVERY FIRST HOUR: Performed by: SURGERY

## 2019-01-17 PROCEDURE — 36000706: Performed by: SURGERY

## 2019-01-17 PROCEDURE — 88305 TISSUE EXAM BY PATHOLOGIST: CPT | Mod: 26,,, | Performed by: PATHOLOGY

## 2019-01-17 PROCEDURE — 88305 TISSUE EXAM BY PATHOLOGIST: CPT | Performed by: PATHOLOGY

## 2019-01-17 PROCEDURE — 25000003 PHARM REV CODE 250: Performed by: STUDENT IN AN ORGANIZED HEALTH CARE EDUCATION/TRAINING PROGRAM

## 2019-01-17 PROCEDURE — 37000009 HC ANESTHESIA EA ADD 15 MINS: Performed by: SURGERY

## 2019-01-17 PROCEDURE — 19316 PR SUSPENSION OF BREAST: ICD-10-PCS | Mod: LT,,, | Performed by: SURGERY

## 2019-01-17 PROCEDURE — 36000707: Performed by: SURGERY

## 2019-01-17 PROCEDURE — 63600175 PHARM REV CODE 636 W HCPCS

## 2019-01-17 PROCEDURE — 20926 PR REMV TISSUE FOR GRAFT OTHR: CPT | Mod: 51,,, | Performed by: SURGERY

## 2019-01-17 PROCEDURE — 19316 MASTOPEXY: CPT | Mod: LT,,, | Performed by: SURGERY

## 2019-01-17 PROCEDURE — D9220A PRA ANESTHESIA: ICD-10-PCS | Mod: ,,, | Performed by: ANESTHESIOLOGY

## 2019-01-17 PROCEDURE — 63600175 PHARM REV CODE 636 W HCPCS: Performed by: STUDENT IN AN ORGANIZED HEALTH CARE EDUCATION/TRAINING PROGRAM

## 2019-01-17 PROCEDURE — 71000033 HC RECOVERY, INTIAL HOUR: Performed by: SURGERY

## 2019-01-17 PROCEDURE — 27201423 OPTIME MED/SURG SUP & DEVICES STERILE SUPPLY: Performed by: SURGERY

## 2019-01-17 PROCEDURE — 37000008 HC ANESTHESIA 1ST 15 MINUTES: Performed by: SURGERY

## 2019-01-17 PROCEDURE — C1729 CATH, DRAINAGE: HCPCS | Performed by: SURGERY

## 2019-01-17 PROCEDURE — 71000015 HC POSTOP RECOV 1ST HR: Performed by: SURGERY

## 2019-01-17 RX ORDER — ONDANSETRON 2 MG/ML
INJECTION INTRAMUSCULAR; INTRAVENOUS
Status: DISCONTINUED | OUTPATIENT
Start: 2019-01-17 | End: 2019-01-17

## 2019-01-17 RX ORDER — CLINDAMYCIN HYDROCHLORIDE 300 MG/1
300 CAPSULE ORAL EVERY 8 HOURS
Qty: 30 CAPSULE | Refills: 0 | Status: SHIPPED | OUTPATIENT
Start: 2019-01-17 | End: 2019-06-03

## 2019-01-17 RX ORDER — SODIUM CHLORIDE 0.9 % (FLUSH) 0.9 %
5 SYRINGE (ML) INJECTION
Status: DISCONTINUED | OUTPATIENT
Start: 2019-01-17 | End: 2019-01-17 | Stop reason: HOSPADM

## 2019-01-17 RX ORDER — PROPOFOL 10 MG/ML
VIAL (ML) INTRAVENOUS
Status: DISCONTINUED | OUTPATIENT
Start: 2019-01-17 | End: 2019-01-17

## 2019-01-17 RX ORDER — MEPERIDINE HYDROCHLORIDE 50 MG/ML
12.5 INJECTION INTRAMUSCULAR; INTRAVENOUS; SUBCUTANEOUS ONCE
Status: COMPLETED | OUTPATIENT
Start: 2019-01-17 | End: 2019-01-17

## 2019-01-17 RX ORDER — HYDROCODONE BITARTRATE AND ACETAMINOPHEN 5; 325 MG/1; MG/1
1 TABLET ORAL EVERY 4 HOURS PRN
Status: DISCONTINUED | OUTPATIENT
Start: 2019-01-17 | End: 2019-01-17 | Stop reason: HOSPADM

## 2019-01-17 RX ORDER — ROCURONIUM BROMIDE 10 MG/ML
INJECTION, SOLUTION INTRAVENOUS
Status: DISCONTINUED | OUTPATIENT
Start: 2019-01-17 | End: 2019-01-17

## 2019-01-17 RX ORDER — ONDANSETRON 2 MG/ML
4 INJECTION INTRAMUSCULAR; INTRAVENOUS EVERY 12 HOURS PRN
Status: DISCONTINUED | OUTPATIENT
Start: 2019-01-17 | End: 2019-01-17 | Stop reason: HOSPADM

## 2019-01-17 RX ORDER — MEPERIDINE HYDROCHLORIDE 50 MG/ML
INJECTION INTRAMUSCULAR; INTRAVENOUS; SUBCUTANEOUS
Status: COMPLETED
Start: 2019-01-17 | End: 2019-01-17

## 2019-01-17 RX ORDER — LIDOCAINE HCL/PF 100 MG/5ML
SYRINGE (ML) INTRAVENOUS
Status: DISCONTINUED | OUTPATIENT
Start: 2019-01-17 | End: 2019-01-17

## 2019-01-17 RX ORDER — ONDANSETRON 2 MG/ML
INJECTION INTRAMUSCULAR; INTRAVENOUS
Status: COMPLETED
Start: 2019-01-17 | End: 2019-01-17

## 2019-01-17 RX ORDER — SODIUM CHLORIDE 9 MG/ML
INJECTION, SOLUTION INTRAVENOUS CONTINUOUS PRN
Status: DISCONTINUED | OUTPATIENT
Start: 2019-01-17 | End: 2019-01-17

## 2019-01-17 RX ORDER — MIDAZOLAM HYDROCHLORIDE 1 MG/ML
INJECTION, SOLUTION INTRAMUSCULAR; INTRAVENOUS
Status: DISCONTINUED | OUTPATIENT
Start: 2019-01-17 | End: 2019-01-17

## 2019-01-17 RX ORDER — GLYCOPYRROLATE 0.2 MG/ML
INJECTION INTRAMUSCULAR; INTRAVENOUS
Status: DISCONTINUED | OUTPATIENT
Start: 2019-01-17 | End: 2019-01-17

## 2019-01-17 RX ORDER — HYDROMORPHONE HYDROCHLORIDE 1 MG/ML
0.2 INJECTION, SOLUTION INTRAMUSCULAR; INTRAVENOUS; SUBCUTANEOUS EVERY 5 MIN PRN
Status: DISCONTINUED | OUTPATIENT
Start: 2019-01-17 | End: 2019-01-17 | Stop reason: HOSPADM

## 2019-01-17 RX ORDER — FENTANYL CITRATE 50 UG/ML
INJECTION, SOLUTION INTRAMUSCULAR; INTRAVENOUS
Status: DISCONTINUED | OUTPATIENT
Start: 2019-01-17 | End: 2019-01-17

## 2019-01-17 RX ORDER — LORAZEPAM 2 MG/ML
INJECTION INTRAMUSCULAR
Status: COMPLETED
Start: 2019-01-17 | End: 2019-01-17

## 2019-01-17 RX ORDER — MUPIROCIN 20 MG/G
1 OINTMENT TOPICAL 2 TIMES DAILY
Status: DISCONTINUED | OUTPATIENT
Start: 2019-01-17 | End: 2019-01-17 | Stop reason: HOSPADM

## 2019-01-17 RX ORDER — SODIUM CHLORIDE 0.9 % (FLUSH) 0.9 %
3 SYRINGE (ML) INJECTION
Status: DISCONTINUED | OUTPATIENT
Start: 2019-01-17 | End: 2019-01-17 | Stop reason: HOSPADM

## 2019-01-17 RX ORDER — INDOMETHACIN 25 MG/1
CAPSULE ORAL
Status: DISCONTINUED
Start: 2019-01-17 | End: 2019-01-17 | Stop reason: HOSPADM

## 2019-01-17 RX ORDER — NEOSTIGMINE METHYLSULFATE 1 MG/ML
INJECTION, SOLUTION INTRAVENOUS
Status: DISCONTINUED | OUTPATIENT
Start: 2019-01-17 | End: 2019-01-17

## 2019-01-17 RX ORDER — ACETAMINOPHEN 10 MG/ML
INJECTION, SOLUTION INTRAVENOUS
Status: DISCONTINUED | OUTPATIENT
Start: 2019-01-17 | End: 2019-01-17

## 2019-01-17 RX ORDER — SODIUM BICARBONATE 1 MEQ/ML
SYRINGE (ML) INTRAVENOUS
Status: DISCONTINUED | OUTPATIENT
Start: 2019-01-17 | End: 2019-01-17 | Stop reason: HOSPADM

## 2019-01-17 RX ORDER — BACITRACIN 500 [USP'U]/G
OINTMENT TOPICAL 3 TIMES DAILY
Qty: 1 TUBE | Refills: 0 | Status: SHIPPED | OUTPATIENT
Start: 2019-01-17 | End: 2019-06-03

## 2019-01-17 RX ORDER — KETAMINE HYDROCHLORIDE 10 MG/ML
INJECTION, SOLUTION INTRAMUSCULAR; INTRAVENOUS
Status: DISCONTINUED | OUTPATIENT
Start: 2019-01-17 | End: 2019-01-17

## 2019-01-17 RX ORDER — CLINDAMYCIN PHOSPHATE 900 MG/50ML
900 INJECTION, SOLUTION INTRAVENOUS
Status: COMPLETED | OUTPATIENT
Start: 2019-01-17 | End: 2019-01-17

## 2019-01-17 RX ORDER — LIDOCAINE HYDROCHLORIDE 10 MG/ML
INJECTION INFILTRATION; PERINEURAL
Status: DISCONTINUED | OUTPATIENT
Start: 2019-01-17 | End: 2019-01-17 | Stop reason: HOSPADM

## 2019-01-17 RX ORDER — PHENYLEPHRINE HYDROCHLORIDE 10 MG/ML
INJECTION INTRAVENOUS
Status: DISCONTINUED | OUTPATIENT
Start: 2019-01-17 | End: 2019-01-17

## 2019-01-17 RX ORDER — LORAZEPAM 2 MG/ML
0.25 INJECTION INTRAMUSCULAR ONCE
Status: COMPLETED | OUTPATIENT
Start: 2019-01-17 | End: 2019-01-17

## 2019-01-17 RX ORDER — HEPARIN SODIUM 5000 [USP'U]/ML
5000 INJECTION, SOLUTION INTRAVENOUS; SUBCUTANEOUS EVERY 8 HOURS
Status: DISCONTINUED | OUTPATIENT
Start: 2019-01-17 | End: 2019-01-17 | Stop reason: HOSPADM

## 2019-01-17 RX ORDER — LIDOCAINE HYDROCHLORIDE 10 MG/ML
1 INJECTION, SOLUTION EPIDURAL; INFILTRATION; INTRACAUDAL; PERINEURAL ONCE
Status: DISCONTINUED | OUTPATIENT
Start: 2019-01-17 | End: 2019-01-17 | Stop reason: HOSPADM

## 2019-01-17 RX ORDER — ACETAMINOPHEN 325 MG/1
TABLET ORAL
Status: DISCONTINUED | OUTPATIENT
Start: 2019-01-17 | End: 2019-01-17

## 2019-01-17 RX ORDER — EPINEPHRINE CONVENIENCE KIT 1 MG/ML(1)
KIT INTRAMUSCULAR; SUBCUTANEOUS
Status: DISCONTINUED | OUTPATIENT
Start: 2019-01-17 | End: 2019-01-17 | Stop reason: HOSPADM

## 2019-01-17 RX ORDER — MUPIROCIN 20 MG/G
OINTMENT TOPICAL
Status: DISCONTINUED | OUTPATIENT
Start: 2019-01-17 | End: 2019-01-17 | Stop reason: HOSPADM

## 2019-01-17 RX ORDER — HYDROCODONE BITARTRATE AND ACETAMINOPHEN 5; 325 MG/1; MG/1
1 TABLET ORAL EVERY 6 HOURS PRN
Qty: 15 TABLET | Refills: 0 | Status: SHIPPED | OUTPATIENT
Start: 2019-01-17 | End: 2019-06-03

## 2019-01-17 RX ORDER — HYDROCODONE BITARTRATE AND ACETAMINOPHEN 5; 325 MG/1; MG/1
TABLET ORAL
Status: COMPLETED
Start: 2019-01-17 | End: 2019-01-17

## 2019-01-17 RX ORDER — ONDANSETRON 2 MG/ML
4 INJECTION INTRAMUSCULAR; INTRAVENOUS DAILY PRN
Status: DISCONTINUED | OUTPATIENT
Start: 2019-01-17 | End: 2019-01-17 | Stop reason: HOSPADM

## 2019-01-17 RX ORDER — TRIAMCINOLONE ACETONIDE 40 MG/ML
INJECTION, SUSPENSION INTRA-ARTICULAR; INTRAMUSCULAR
Status: DISCONTINUED | OUTPATIENT
Start: 2019-01-17 | End: 2019-01-17 | Stop reason: HOSPADM

## 2019-01-17 RX ORDER — DEXAMETHASONE SODIUM PHOSPHATE 4 MG/ML
INJECTION, SOLUTION INTRA-ARTICULAR; INTRALESIONAL; INTRAMUSCULAR; INTRAVENOUS; SOFT TISSUE
Status: DISCONTINUED | OUTPATIENT
Start: 2019-01-17 | End: 2019-01-17

## 2019-01-17 RX ADMIN — MEPERIDINE HYDROCHLORIDE 12.5 MG: 50 INJECTION INTRAMUSCULAR; INTRAVENOUS; SUBCUTANEOUS at 10:01

## 2019-01-17 RX ADMIN — PHENYLEPHRINE HYDROCHLORIDE 100 MCG: 10 INJECTION INTRAVENOUS at 07:01

## 2019-01-17 RX ADMIN — SODIUM CHLORIDE: 0.9 INJECTION, SOLUTION INTRAVENOUS at 07:01

## 2019-01-17 RX ADMIN — CLINDAMYCIN IN 5 PERCENT DEXTROSE 900 MG: 18 INJECTION, SOLUTION INTRAVENOUS at 07:01

## 2019-01-17 RX ADMIN — PROPOFOL 150 MG: 10 INJECTION, EMULSION INTRAVENOUS at 07:01

## 2019-01-17 RX ADMIN — GLYCOPYRROLATE 0.2 MG: 0.2 INJECTION, SOLUTION INTRAMUSCULAR; INTRAVENOUS at 08:01

## 2019-01-17 RX ADMIN — SODIUM CHLORIDE, SODIUM GLUCONATE, SODIUM ACETATE, POTASSIUM CHLORIDE, MAGNESIUM CHLORIDE, SODIUM PHOSPHATE, DIBASIC, AND POTASSIUM PHOSPHATE: .53; .5; .37; .037; .03; .012; .00082 INJECTION, SOLUTION INTRAVENOUS at 08:01

## 2019-01-17 RX ADMIN — HEPARIN SODIUM 5000 UNITS: 5000 INJECTION, SOLUTION INTRAVENOUS; SUBCUTANEOUS at 06:01

## 2019-01-17 RX ADMIN — PHENYLEPHRINE HYDROCHLORIDE 200 MCG: 10 INJECTION INTRAVENOUS at 07:01

## 2019-01-17 RX ADMIN — NEOSTIGMINE METHYLSULFATE 3.5 MG: 1 INJECTION INTRAVENOUS at 09:01

## 2019-01-17 RX ADMIN — ONDANSETRON 4 MG: 2 INJECTION INTRAMUSCULAR; INTRAVENOUS at 09:01

## 2019-01-17 RX ADMIN — HYDROCODONE BITARTRATE AND ACETAMINOPHEN 1 TABLET: 5; 325 TABLET ORAL at 10:01

## 2019-01-17 RX ADMIN — DEXAMETHASONE SODIUM PHOSPHATE 4 MG: 4 INJECTION, SOLUTION INTRAMUSCULAR; INTRAVENOUS at 08:01

## 2019-01-17 RX ADMIN — FENTANYL CITRATE 100 MCG: 50 INJECTION, SOLUTION INTRAMUSCULAR; INTRAVENOUS at 07:01

## 2019-01-17 RX ADMIN — GLYCOPYRROLATE 0.4 MG: 0.2 INJECTION, SOLUTION INTRAMUSCULAR; INTRAVENOUS at 09:01

## 2019-01-17 RX ADMIN — KETAMINE HYDROCHLORIDE 25 MG: 10 INJECTION, SOLUTION INTRAMUSCULAR; INTRAVENOUS at 07:01

## 2019-01-17 RX ADMIN — FENTANYL CITRATE 25 MCG: 50 INJECTION, SOLUTION INTRAMUSCULAR; INTRAVENOUS at 09:01

## 2019-01-17 RX ADMIN — ONDANSETRON 4 MG: 2 INJECTION INTRAMUSCULAR; INTRAVENOUS at 10:01

## 2019-01-17 RX ADMIN — MUPIROCIN: 20 OINTMENT TOPICAL at 06:01

## 2019-01-17 RX ADMIN — LORAZEPAM 0.25 MG: 2 INJECTION INTRAMUSCULAR at 10:01

## 2019-01-17 RX ADMIN — KETAMINE HYDROCHLORIDE 10 MG: 10 INJECTION, SOLUTION INTRAMUSCULAR; INTRAVENOUS at 08:01

## 2019-01-17 RX ADMIN — PHENYLEPHRINE HYDROCHLORIDE 100 MCG: 10 INJECTION INTRAVENOUS at 08:01

## 2019-01-17 RX ADMIN — MIDAZOLAM HYDROCHLORIDE 2 MG: 1 INJECTION, SOLUTION INTRAMUSCULAR; INTRAVENOUS at 07:01

## 2019-01-17 RX ADMIN — ACETAMINOPHEN 1000 MG: 10 INJECTION, SOLUTION INTRAVENOUS at 08:01

## 2019-01-17 RX ADMIN — LORAZEPAM 0.25 MG: 2 INJECTION, SOLUTION INTRAMUSCULAR; INTRAVENOUS at 10:01

## 2019-01-17 RX ADMIN — ROCURONIUM BROMIDE 50 MG: 10 INJECTION, SOLUTION INTRAVENOUS at 07:01

## 2019-01-17 RX ADMIN — LIDOCAINE HYDROCHLORIDE 100 MG: 20 INJECTION, SOLUTION INTRAVENOUS at 07:01

## 2019-01-17 NOTE — ANESTHESIA POSTPROCEDURE EVALUATION
"Anesthesia Post Evaluation    Patient: Ira Novoa    Procedure(s) Performed: Procedure(s) (LRB):  BREAST REVISION SURGERY (Bilateral)  RECONSTRUCTION, BREAST FAT TRANSFER RIGHT BREAST (Right)  RECONSTRUCTION, NIPPLE CRESCENT LIFT LEFT NIPPLE (Left)    Final Anesthesia Type: general  Patient location during evaluation: PACU  Patient participation: Yes- Able to Participate  Level of consciousness: awake and alert and oriented  Post-procedure vital signs: reviewed and stable  Pain management: adequate  Airway patency: patent  PONV status at discharge: No PONV  Anesthetic complications: no      Cardiovascular status: stable  Respiratory status: unassisted  Hydration status: euvolemic  Follow-up not needed.        Visit Vitals  /67   Pulse (!) 56   Temp 36.5 °C (97.7 °F) (Temporal)   Resp 16   Ht 5' 4" (1.626 m)   Wt 68.5 kg (151 lb)   SpO2 96%   Breastfeeding? No   BMI 25.92 kg/m²       Pain/Radha Score: Pain Rating Prior to Med Admin: 4 (1/17/2019 10:49 AM)  Pain Rating Post Med Admin: 0 (1/17/2019 11:30 AM)  Radha Score: 10 (1/17/2019 12:07 PM)        "

## 2019-01-17 NOTE — DISCHARGE SUMMARY
[facility]  Discharge Note  Short Stay    Admit Date: 1/17/2019    Discharge Date and Time: No discharge date for patient encounter.     Attending Physician: Donnie Wilde, *     Discharge Provider: Tom Lazcano    Diagnoses:  Active Hospital Problems    Diagnosis  POA    Personal history of breast cancer [Z85.3]  Not Applicable      Resolved Hospital Problems   No resolved problems to display.       Discharged Condition: good    Hospital Course: Patient was admitted for an outpatient procedure and tolerated the procedure well with no complications.    Final Diagnoses: Same as principal problem.    Disposition: Home or Self Care    Follow up/Patient Instructions:      Do not wear a bra until clinic visit in 1 week (Wednesday)  Can remove dressings and shower in 48 hours  Shower with warm soapy water, tap dry, do not scrub incisions  Apply Bacitracin cream to left nipple 3x per day  Can put bandaid over 2 incisions of right breast and abdominal incision after 48 hours  Keep abdominal binder on at all times except when showering  Take Clindamycin antibiotic 3x per day until clinic visit      Medications:  Reconciled Home Medications:      Medication List      START taking these medications    bacitracin 500 unit/gram ointment  Apply topically 3 (three) times daily. Apply to left nipple 3x per day     clindamycin 300 MG capsule  Commonly known as:  CLEOCIN  Take 1 capsule (300 mg total) by mouth every 8 (eight) hours.     HYDROcodone-acetaminophen 5-325 mg per tablet  Commonly known as:  NORCO  Take 1 tablet by mouth every 6 (six) hours as needed for Pain.        CONTINUE taking these medications    esomeprazole magnesium 10 mg Grps  Commonly known as:  NEXIUM  Take 10 mg by mouth before breakfast.          Discharge Procedure Orders   Diet general     Lifting restrictions   Order Comments: No lifting more than 5lbs until clinic visit     Call MD for:  severe uncontrolled pain     Remove  dressing in 24 hours     Shower on day dressing removed (No bath)         Discharge Procedure Orders (must include Diet, Follow-up, Activity):   Discharge Procedure Orders (must include Diet, Follow-up, Activity)   Diet general     Lifting restrictions   Order Comments: No lifting more than 5lbs until clinic visit     Call MD for:  severe uncontrolled pain     Remove dressing in 24 hours     Shower on day dressing removed (No bath)

## 2019-01-17 NOTE — TRANSFER OF CARE
"Anesthesia Transfer of Care Note    Patient: Ira Novoa    Procedure(s) Performed: Procedure(s) (LRB):  BREAST REVISION SURGERY (Bilateral)  RECONSTRUCTION, BREAST FAT TRANSFER RIGHT BREAST (Right)  RECONSTRUCTION, NIPPLE CRESCENT LIFT LEFT NIPPLE (Left)    Patient location: PACU    Anesthesia Type: general    Transport from OR: Transported from OR on 6-10 L/min O2 by face mask with adequate spontaneous ventilation    Post pain: adequate analgesia    Post assessment: no apparent anesthetic complications    Post vital signs: stable    Level of consciousness: awake    Nausea/Vomiting: no nausea/vomiting    Complications: none    Transfer of care protocol was followed      Last vitals:   Visit Vitals  /72 (BP Location: Left arm, Patient Position: Lying)   Pulse 67   Temp 36.6 °C (97.8 °F) (Oral)   Resp 17   Ht 5' 4" (1.626 m)   Wt 68.5 kg (151 lb)   SpO2 97%   Breastfeeding? No   BMI 25.92 kg/m²     "

## 2019-01-17 NOTE — H&P
History & Physical    SUBJECTIVE:     History of Present Illness:  Ira Novoa is a 63 y.o. female who [place] [facility] [place].     Past Medical History:   Diagnosis Date    Hydrocephalus        Past Surgical History:   Procedure Laterality Date    BIOPSY, LYMPH NODE, SENTINEL Right 9/24/2018    Performed by [facility]    BRAIN SURGERY      CHOLECYSTECTOMY      INJECTION, FOR SENTINEL NODE IDENTIFICATION Right 9/24/2018    Performed by [facility]    INSERTION, BREAST IMPLANT Bilateral 9/24/2018    [facility]    INSERTION, TISSUE EXPANDER, BREAST Bilateral 9/24/2018    Performed by Donnie Wilde MD at [facility] OR [address] [place] [address]    MASTECTOMY Bilateral 9/24/2018    Performed by Edgar Eden MD at [facility] OR [address] [place] [address]       No family history on file.    Social History     Socioeconomic History    Marital status:      Spouse name: Not on file    Number of children: Not on file    Years of education: Not on file    Highest education level: Not on file   Social Needs    Financial resource strain: Not on file    Food insecurity - worry: Not on file    Food insecurity - inability: Not on file    Transportation needs - medical: Not on file    Transportation needs - non-medical: Not on file   Occupational History    Not on file   Tobacco Use    Smoking status: Never Smoker   Substance and Sexual Activity    Alcohol use: No    Drug use: Not on file    Sexual activity: Not on file   Other Topics Concern    Not on file   Social History Narrative    Not on file       Current Facility-Administered Medications   Medication Dose Route Frequency Provider Last Rate Last Dose    clindamycin 900 MG/50 ML D5W 900 mg/50 mL IVPB 900 mg  900 mg Intravenous On Call Procedure Donnie Wilde MD        heparin (porcine) injection 5,000 Units  5,000 Units  Subcutaneous Q8H Donnie Wilde MD        lidocaine (PF) 10 mg/ml (1%) injection 10 mg  1 mL Intradermal Once Donnie Wilde MD        mupirocin 2 % ointment   Nasal On Call Procedure Donnie Wilde MD        sodium chloride 0.9% flush 5 mL  5 mL Intravenous PRN Donnie Wilde MD           Review of patient's allergies indicates:   Allergen Reactions    Penicillins Other (See Comments)     Yeast infection       Physical Exam:  BREASTS:  She is status post bialteral nipple sparing mastetomies with prostheses in place.  I do not palpated any masses in the left reconstructed breast laterally.  There are no masses in ehe right reconstructed breast.     LYMPHATIC:  There is no cervical, axillary, or supraclavicular adenopathy.   SKIN:  Warm and moist, without petechiae, rashes, induration, or ecchymoses.             ASSESSMENT/PLAN:  - Surgical consent obtained and all questions answered  - Surgical site marked  - Proceed to OR

## 2019-01-17 NOTE — BRIEF OP NOTE
[facility]  Brief Operative Note     SUMMARY     Surgery Date: 1/17/2019     Surgeon(s) and Role:     * Donnie Wilde MD - Primary     * Donnie Miguel MD - Fellow    Assisting Surgeon: None    Pre-op Diagnosis:  Personal history of malignant neoplasm of breast [Z85.3]    Post-op Diagnosis:  Post-Op Diagnosis Codes:     * Personal history of malignant neoplasm of breast [Z85.3]    Procedure(s) (LRB):  BREAST REVISION SURGERY (Bilateral)  RECONSTRUCTION, BREAST FAT TRANSFER RIGHT BREAST (Right)  RECONSTRUCTION, NIPPLE CRESCENT LIFT LEFT NIPPLE (Left)    Anesthesia: General    Description of the findings of the procedure: left breast lift and fat tissue transfer to right breast    Findings/Key Components: see op note    Estimated Blood Loss: * No values recorded between 1/17/2019  8:20 AM and 1/17/2019  9:48 AM *         Specimens:   Specimen (12h ago, onward)    None          Discharge Note    SUMMARY     Admit Date: 1/17/2019    Discharge Date and Time: 01/17/2019      Hospital Course (synopsis of major diagnoses, care, treatment, and services provided during the course of the hospital stay): Hospital Course:  On 1/17/2019, the patient arrived to pre-op area for proper pre-operative management.  Upon completion of pre-operative preparation, the patient was taken back to the operative theatre.  A left breast lift and fat tissue transfer to right breast was performed without complication and the patient was transported to the post anesthesia care unit in stable condition. The patient suffered minimal blood loss and electrolyte imbalances during the procedure, which were correct accordingly if neccessary. After appropriate recovery from the anaesthetic agents used during the surgery the patient was discharged home.         Final Diagnosis: Post-Op Diagnosis Codes:     * Personal history of malignant neoplasm of breast [Z85.3]    Disposition: Home or Self Care    Follow Up/Patient  Instructions:     Do not wear a bra until clinic visit in 1 week (Wednesday)  Can remove dressings and shower in 48 hours  Shower with warm soapy water, tap dry, do not scrub incisions  Apply Bacitracin cream to left nipple 3x per day  Can put bandaid over 2 incisions of right breast and abdominal incision after 48 hours  Keep abdominal binder on at all times except when showering  Take Clindamycin antibiotic 3x per day until clinic visit    Medications:  Reconciled Home Medications:      Medication List      START taking these medications    bacitracin 500 unit/gram ointment  Apply topically 3 (three) times daily. Apply to left nipple 3x per day     clindamycin 300 MG capsule  Commonly known as:  CLEOCIN  Take 1 capsule (300 mg total) by mouth every 8 (eight) hours.     HYDROcodone-acetaminophen 5-325 mg per tablet  Commonly known as:  NORCO  Take 1 tablet by mouth every 6 (six) hours as needed for Pain.        CONTINUE taking these medications    esomeprazole magnesium 10 mg Grps  Commonly known as:  NEXIUM  Take 10 mg by mouth before breakfast.          Discharge Procedure Orders   Diet general     Lifting restrictions   Order Comments: No lifting more than 5lbs until clinic visit     Call MD for:  severe uncontrolled pain     Remove dressing in 24 hours     Shower on day dressing removed (No bath)

## 2019-01-17 NOTE — DISCHARGE INSTRUCTIONS
Do not wear bras. Wear abdominal binder.    PATIENT INSTRUCTIONS  POST-ANESTHESIA    IMMEDIATELY FOLLOWING SURGERY:  Do not drive or operate machinery for the first twenty four hours after surgery.  Do not make any important decisions for twenty four hours after surgery or while taking narcotic pain medications or sedatives.  If you develop intractable nausea and vomiting or a severe headache please notify your doctor immediately.    FOLLOW-UP:  Please make an appointment with your surgeon as instructed. You do not need to follow up with anesthesia unless specifically instructed to do so.    WOUND CARE INSTRUCTIONS (if applicable):  Keep a dry clean dressing on the anesthesia/puncture wound site if there is drainage.  Once the wound has quit draining you may leave it open to air.  Generally you should leave the bandage intact for twenty four hours unless there is drainage.  If the epidural site drains for more than 36-48 hours please call the anesthesia department.    QUESTIONS?:  Please feel free to call your physician or the hospital  if you have any questions, and they will be happy to assist you.           Incision Care  Remember: Follow-up visits allow your healthcare provider to make sure your incision is healing well. Be sure to keep your appointments.     Stitches (sutures), surgical staples, special strips of surgical tape, or surgical skin glue may be used to close incisions. They also help stop bleeding and speed healing. To help your incision heal, follow the tips on this handout.  Home care  Tips for home care include the following:  · Always wash your hands before touching your incision.  · Keep your incision clean and dry.  · Avoid doing things that could cause dirt or sweat to get on your incision.  · Dont pick at scabs. They help protect the wound.  · Keep your incision out of water.  · Take a sponge bath to avoid getting your incision wet, unless your healthcare provider tells you  otherwise.  · Ask your provider when can you take a shower or bathe.  · Ask your provider about the best way to keep your incision dry when bathing or showering.  · Pat stitches dry if they get wet. Dont rub.  · Leave the bandage (dressing) in place until you are told to remove it or change it. Change it only as directed, using clean hands.  · After the first 12 hours, change your dressing every 24 hours, or as directed by your healthcare provider.  · Change your dressing if it gets wet or soiled.  Care for specific closures  Follow these guidelines unless your healthcare provider tells you otherwise:  · Stitches or staples. Once you no longer need to keep these dry, clean the wound daily. First remove the bandage using clean hands. Then wash the area gently with soap and warm water. Use a wet cotton swab to loosen and remove any blood or crust that forms. After cleaning, put a thin layer of antibiotic ointment on. Then put on a new bandage.  · Skin glue. Dont put liquid, ointment, or cream on your wound while the glue is in place. Avoid activities that cause heavy sweating. Protect the wound from sunlight. Do not scratch, rub, or pick at the glue. Do not put tape directly over the glue. The glue should peel off within 5 to 10 days.  · Surgical tape. Keep the area dry. If it gets wet, blot the area dry with a clean towel. Surgical tape usually falls off within 7 to 10 days. If it has not fallen off after 10 days, contact your healthcare provider before taking it off yourself. If you are told to remove the tape, put mineral oil or petroleum jelly on a cotton ball. Gently rub the tape until it is removed.  Changing your dressing  Leave the dressing (bandage) in place until you are told to remove it or change it. Follow the instructions below unless told otherwise by your healthcare provider:  · Always wash your hands before changing your dressing.  · After the first 48 hours, the incision wound usually will have  closed. At this point, leave the incision uncovered and open to the air. If the incision has not closed keep it covered.  · Cover your incision only if your clothing is rubbing it or causing irritation.  · Change your dressing if it gets wet or soiled.  Follow-up care  Follow up with your healthcare provider to ask how long sutures or staples should be left in place. Be sure to return for stitch or staple removal as directed. If dissolving stitches were used in your mouth, these will not need to be removed. They should fall out or dissolve on their own.  If tape closures were used, remove them yourself when your provider recommends if they have not fallen off on their own. If skin glue was used, the glue will wear off by itself.  When to seek medical care  Call your healthcare provider if you have any of the following:  · More pain, redness, swelling, bleeding, or foul-smelling discharge around the incision area  · Fever of 100.4°F (38ºC) or higher, or as directed by your healthcare provider  · Shaking chills  · Vomiting or nausea that doesn't go away  · Numbness, coldness, or tingling around the incision area, or changes in skin color  · Opening of the sutures or wound  · Stitches or staples come apart or fall out or surgical tape falls off before 7 days or as directed by your healthcare provider   Date Last Reviewed: 12/1/2016  © 9091-3057 [employer]. [address] [place] [address], [place] [address]. All rights reserved. This information is not intended as a substitute for professional medical care. Always follow your healthcare professional's instructions.

## 2019-01-21 NOTE — OP NOTE
DATE OF PROCEDURE:  01/17/2019.    PREOPERATIVE DIAGNOSIS:  History of breast cancer.    POSTOPERATIVE DIAGNOSIS:  History of breast cancer.    PROCEDURES PERFORMED:  Left crescent mastopexy and free fat transfer to the   right breast.    DESCRIPTION OF PROCEDURE:  The patient was evaluated in the preoperative holding   area.  Markings for the crescent mastopexy were made as well as for free fat   grafting of the right breast.  The patient was taken to the operative room,   placed in the supine position, after adequate general endotracheal anesthesia,   was prepped and draped in normal sterile fashion.  Tumescent fluid was then   instilled into the abdomen.  Twenty minutes were then allowed to elapse.  While   the tumescent fluid was sitting, we went ahead and marked a crescent mastopexy.    The skin was incised using a #15 blade and then de-epithelialized.  The closure   was performed using interrupted 4-0 Monocryl followed by interrupted 6-0 nylon   sutures followed by running 4-0 Monocryl subcuticular suture.  There were no   complications with this portion of the procedure.  Next, liposuction using a   2-mm cannula followed by a 4 mm cannula was then performed.  The fat was washed   in the Revolve system and loaded into syringes and 120 mL were injected in the   superomedial aspect of the right breast as well as the anterior portion of the   right breast.  These incisions were closed using 6-0 nylon.  There were no   complications.      YANDEL/DAY  dd: 01/21/2019 12:00:57 (CST)  td: 01/21/2019 13:24:08 (CST)  Doc ID   #[id number]  Job ID #[id number]    CC:

## 2019-01-23 ENCOUNTER — OFFICE VISIT (OUTPATIENT)
Dept: PLASTIC SURGERY | Facility: CLINIC | Age: 64
End: 2019-01-23
Payer: COMMERCIAL

## 2019-01-23 VITALS
TEMPERATURE: 98 F | SYSTOLIC BLOOD PRESSURE: 111 MMHG | DIASTOLIC BLOOD PRESSURE: 79 MMHG | WEIGHT: 153.44 LBS | HEART RATE: 92 BPM | BODY MASS INDEX: 26.34 KG/M2

## 2019-01-23 DIAGNOSIS — Z09 SURGERY FOLLOW-UP EXAMINATION: Primary | ICD-10-CM

## 2019-01-23 PROCEDURE — 99999 PR PBB SHADOW E&M-EST. PATIENT-LVL III: CPT | Mod: PBBFAC,,, | Performed by: SURGERY

## 2019-01-23 PROCEDURE — 99024 POSTOP FOLLOW-UP VISIT: CPT | Mod: S$GLB,,, | Performed by: SURGERY

## 2019-01-23 PROCEDURE — 99024 PR POST-OP FOLLOW-UP VISIT: ICD-10-PCS | Mod: S$GLB,,, | Performed by: SURGERY

## 2019-01-23 PROCEDURE — 99999 PR PBB SHADOW E&M-EST. PATIENT-LVL III: ICD-10-PCS | Mod: PBBFAC,,, | Performed by: SURGERY

## 2019-01-23 NOTE — PROGRESS NOTES
SP lt mastopexy and right breast free   Fat grafting. She looks good.  Has some ecchymosis.  Sutures removed.  RTC in 2 weeks

## 2019-02-06 ENCOUNTER — OFFICE VISIT (OUTPATIENT)
Dept: PLASTIC SURGERY | Facility: CLINIC | Age: 64
End: 2019-02-06
Payer: COMMERCIAL

## 2019-02-06 VITALS
WEIGHT: 154.44 LBS | HEART RATE: 74 BPM | BODY MASS INDEX: 26.37 KG/M2 | HEIGHT: 64 IN | DIASTOLIC BLOOD PRESSURE: 65 MMHG | SYSTOLIC BLOOD PRESSURE: 118 MMHG

## 2019-02-06 DIAGNOSIS — Z09 SURGERY FOLLOW-UP EXAMINATION: Primary | ICD-10-CM

## 2019-02-06 PROCEDURE — 99999 PR PBB SHADOW E&M-EST. PATIENT-LVL III: ICD-10-PCS | Mod: PBBFAC,,, | Performed by: PHYSICIAN ASSISTANT

## 2019-02-06 PROCEDURE — 99024 PR POST-OP FOLLOW-UP VISIT: ICD-10-PCS | Mod: S$GLB,,, | Performed by: PHYSICIAN ASSISTANT

## 2019-02-06 PROCEDURE — 99999 PR PBB SHADOW E&M-EST. PATIENT-LVL III: CPT | Mod: PBBFAC,,, | Performed by: PHYSICIAN ASSISTANT

## 2019-02-06 PROCEDURE — 99024 POSTOP FOLLOW-UP VISIT: CPT | Mod: S$GLB,,, | Performed by: PHYSICIAN ASSISTANT

## 2019-02-06 NOTE — PROGRESS NOTES
Subjective:      Ira Novoa is a 63 y.o. year old female who [facility] for follow up visit status post second stage breast reconstruction on 01/17/2019. Denies fever, chills, nausea, vomiting, or other systemic signs of infection. She was seen and evaluated by myself and Dr. Donnie Wilde      Vitals:    02/06/19 0829   BP: 118/65   Pulse: 74        Review of patient's allergies indicates:   Allergen Reactions    Penicillins Other (See Comments)     Yeast infection       Current Outpatient Medications on File Prior to Visit   Medication Sig Dispense Refill    bacitracin 500 unit/gram ointment Apply topically 3 (three) times daily. Apply to left nipple 3x per day 1 Tube 0    clindamycin (CLEOCIN) 300 MG capsule Take 1 capsule (300 mg total) by mouth every 8 (eight) hours. 30 capsule 0    esomeprazole magnesium (NEXIUM) 10 mg GrPS Take 10 mg by mouth before breakfast.      HYDROcodone-acetaminophen (NORCO) 5-325 mg per tablet Take 1 tablet by mouth every 6 (six) hours as needed for Pain. 15 tablet 0     No current facility-administered medications on file prior to visit.        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 breast    Breast cancer    History of DVT in adulthood    Personal history of breast cancer       [unfilled]    Social History     Socioeconomic History    Marital status:      Spouse name: Not on file    Number of children: Not on file    Years of education: Not on file    Highest education level: Not on file   Social Needs    Financial resource strain: Not on file    Food insecurity - worry: Not on file    Food insecurity - inability: Not on file    Transportation needs - medical: Not on file    Transportation needs - non-medical: Not on file   Occupational History    Not on file   Tobacco Use    Smoking status: Never Smoker   Substance and Sexual Activity    Alcohol  use: No    Drug use: Not on file    Sexual activity: Not on file   Other Topics Concern    Not on file   Social History Narrative    Not on file     DATE OF PROCEDURE:  01/17/2019.     PREOPERATIVE DIAGNOSIS:  History of breast cancer.     POSTOPERATIVE DIAGNOSIS:  History of breast cancer.     PROCEDURES PERFORMED:  Left crescent mastopexy and free fat transfer to the   right breast.      Review of Systems: negative    Objective:     Physical Exam:  Vitals:    02/06/19 0829   BP: 118/65   Pulse: 74       WD WN NAD  VSS  Normal resp effort  R breast - incision CDI, no erythema/drainage, nipple viable   L breast - incision CDI, no erythema/drainage, nipple viable                 Assessment:       1. Surgery follow-up examination        Plan:   63 y.o. female status post second stage breast reconstruction  - Doing well, no issues  - Breast are soft and symmetric  - Pleased with outcome  - RTC x 3 weeks, appointment scheduled    The patient was advised to call the clinic with any questions or concerns prior to their next visit.

## 2019-02-28 ENCOUNTER — TELEPHONE (OUTPATIENT)
Dept: PLASTIC SURGERY | Facility: CLINIC | Age: 64
End: 2019-02-28

## 2019-02-28 NOTE — TELEPHONE ENCOUNTER
pt called to cancel appt I asked pt if she would like to reschedule and pt stated not at the moment.

## 2019-04-16 ENCOUNTER — HOSPITAL ENCOUNTER (EMERGENCY)
Facility: HOSPITAL | Age: 64
Discharge: HOME | End: 2019-04-16
Attending: SURGERY
Payer: COMMERCIAL

## 2019-04-16 VITALS — BODY MASS INDEX: 26.12 KG/M2 | WEIGHT: 153 LBS | HEIGHT: 64 IN

## 2019-04-16 DIAGNOSIS — R10.30 LOWER ABDOMINAL PAIN: Primary | ICD-10-CM

## 2019-04-16 LAB
ALBUMIN SERPL BCP-MCNC: 4.2 G/DL (ref 3.5–5.2)
ALP SERPL-CCNC: 133 U/L (ref 38–126)
ALT SERPL W/O P-5'-P-CCNC: 18 U/L (ref 10–44)
ANION GAP SERPL CALC-SCNC: 5 MMOL/L (ref 8–16)
AST SERPL-CCNC: 20 U/L (ref 15–46)
BASOPHILS # BLD AUTO: 0.04 K/UL (ref 0–0.2)
BASOPHILS NFR BLD: 0.6 % (ref 0–1.9)
BILIRUB SERPL-MCNC: 0.5 MG/DL (ref 0.1–1)
BILIRUB UR QL STRIP: NEGATIVE
BUN SERPL-MCNC: 10 MG/DL (ref 7–17)
CALCIUM SERPL-MCNC: 9.5 MG/DL (ref 8.7–10.5)
CHLORIDE SERPL-SCNC: 107 MMOL/L (ref 95–110)
CLARITY UR REFRACT.AUTO: CLEAR
CO2 SERPL-SCNC: 29 MMOL/L (ref 23–29)
COLOR UR AUTO: YELLOW
CREAT SERPL-MCNC: 0.66 MG/DL (ref 0.5–1.4)
DIFFERENTIAL METHOD: ABNORMAL
EOSINOPHIL # BLD AUTO: 0.1 K/UL (ref 0–0.5)
EOSINOPHIL NFR BLD: 1 % (ref 0–8)
ERYTHROCYTE [DISTWIDTH] IN BLOOD BY AUTOMATED COUNT: 14 % (ref 11.5–14.5)
EST. GFR  (AFRICAN AMERICAN): >60 ML/MIN/1.73 M^2
EST. GFR  (NON AFRICAN AMERICAN): >60 ML/MIN/1.73 M^2
GLUCOSE SERPL-MCNC: 100 MG/DL (ref 70–110)
GLUCOSE UR QL STRIP: NEGATIVE
HCT VFR BLD AUTO: 41.1 % (ref 37–48.5)
HGB BLD-MCNC: 12.6 G/DL (ref 12–16)
HGB UR QL STRIP: NEGATIVE
KETONES UR QL STRIP: NEGATIVE
LACTATE SERPL-SCNC: 1.1 MMOL/L (ref 0.5–2.2)
LEUKOCYTE ESTERASE UR QL STRIP: NEGATIVE
LIPASE SERPL-CCNC: 95 U/L (ref 23–300)
LYMPHOCYTES # BLD AUTO: 2.4 K/UL (ref 1–4.8)
LYMPHOCYTES NFR BLD: 35.3 % (ref 18–48)
MCH RBC QN AUTO: 28.1 PG (ref 27–31)
MCHC RBC AUTO-ENTMCNC: 30.7 G/DL (ref 32–36)
MCV RBC AUTO: 92 FL (ref 82–98)
MONOCYTES # BLD AUTO: 0.6 K/UL (ref 0.3–1)
MONOCYTES NFR BLD: 8 % (ref 4–15)
NEUTROPHILS # BLD AUTO: 3.8 K/UL (ref 1.8–7.7)
NEUTROPHILS NFR BLD: 55 % (ref 38–73)
NITRITE UR QL STRIP: NEGATIVE
PH UR STRIP: 7 [PH] (ref 5–8)
PLATELET # BLD AUTO: 272 K/UL (ref 150–350)
PMV BLD AUTO: 10.4 FL (ref 9.2–12.9)
POTASSIUM SERPL-SCNC: 3.8 MMOL/L (ref 3.5–5.1)
PROT SERPL-MCNC: 7.8 G/DL (ref 6–8.4)
PROT UR QL STRIP: NEGATIVE
RBC # BLD AUTO: 4.49 M/UL (ref 4–5.4)
SODIUM SERPL-SCNC: 141 MMOL/L (ref 136–145)
SP GR UR STRIP: 1 (ref 1–1.03)
URN SPEC COLLECT METH UR: NORMAL
UROBILINOGEN UR STRIP-ACNC: NEGATIVE EU/DL
WBC # BLD AUTO: 6.89 K/UL (ref 3.9–12.7)

## 2019-04-16 PROCEDURE — 87040 BLOOD CULTURE FOR BACTERIA: CPT | Mod: ER

## 2019-04-16 PROCEDURE — 83690 ASSAY OF LIPASE: CPT | Mod: ER

## 2019-04-16 PROCEDURE — 99285 EMERGENCY DEPT VISIT HI MDM: CPT | Mod: 25,ER

## 2019-04-16 PROCEDURE — 85025 COMPLETE CBC W/AUTO DIFF WBC: CPT | Mod: ER

## 2019-04-16 PROCEDURE — 25000003 PHARM REV CODE 250: Mod: ER | Performed by: PHYSICIAN ASSISTANT

## 2019-04-16 PROCEDURE — 80053 COMPREHEN METABOLIC PANEL: CPT | Mod: ER

## 2019-04-16 PROCEDURE — 83605 ASSAY OF LACTIC ACID: CPT | Mod: ER

## 2019-04-16 PROCEDURE — 81003 URINALYSIS AUTO W/O SCOPE: CPT | Mod: ER

## 2019-04-16 PROCEDURE — 25500020 PHARM REV CODE 255: Mod: ER | Performed by: SURGERY

## 2019-04-16 PROCEDURE — 96360 HYDRATION IV INFUSION INIT: CPT | Mod: ER

## 2019-04-16 RX ORDER — SODIUM CHLORIDE 9 MG/ML
1000 INJECTION, SOLUTION INTRAVENOUS
Status: COMPLETED | OUTPATIENT
Start: 2019-04-16 | End: 2019-04-16

## 2019-04-16 RX ORDER — NAPROXEN 500 MG/1
500 TABLET ORAL 2 TIMES DAILY WITH MEALS
Qty: 14 TABLET | Refills: 0 | Status: SHIPPED | OUTPATIENT
Start: 2019-04-16 | End: 2019-06-03

## 2019-04-16 RX ADMIN — SODIUM CHLORIDE 1000 ML: 0.9 INJECTION, SOLUTION INTRAVENOUS at 11:04

## 2019-04-16 RX ADMIN — IOHEXOL 100 ML: 350 INJECTION, SOLUTION INTRAVENOUS at 12:04

## 2019-04-16 NOTE — ED TRIAGE NOTES
PT reprots right lower abdominal pain x 10 days. Denies NVD or urianry symtpoms. Sent from [facility] office- [facility] reprots + guarding and rebound

## 2019-04-16 NOTE — ED PROVIDER NOTES
Encounter Date: 4/16/2019       History     Chief Complaint   Patient presents with    Abdominal Pain     PT reprots right lower abdominal pain x 10 days. Denies NVD or urianry symtpoms. Sent from [facility] office- [facility] reprots + guarding and rebound     Patient is a 64-year-old female presenting with complaint of constant moderate aching pain that began in the right lower quadrant 10 days ago and now has some pain in the left lower quadrant as well.  There are no exacerbating or relieving factors.  She was recently treated for strep pharyngitis.  No nausea, vomiting, diarrhea, constipation or urinary symptoms.  No melena or hematochezia.  She was seen by her PCP and advised to come to the ED for further evaluation.        Review of patient's allergies indicates:   Allergen Reactions    Penicillins Other (See Comments)     Yeast infection     Past Medical History:   Diagnosis Date    Breast cancer     DVT (deep venous thrombosis)     Hydrocephalus     Thyroid nodule      Past Surgical History:   Procedure Laterality Date    BIOPSY, LYMPH NODE, SENTINEL Right 9/24/2018    Performed [facility]    BRAIN SURGERY      BREAST REVISION SURGERY Bilateral 1/17/2019    [facility] at [facility]    CHOLECYSTECTOMY      LISSY FILTER PLACEMENT      INJECTION, FOR SENTINEL NODE IDENTIFICATION Right 9/24/2018    Performed by [facility]    INSERTION, BREAST IMPLANT Bilateral 9/24/2018    Per[facility] at [facility]    INSERTION, TISSUE EXPANDER, BREAST Bilateral 9/24/2018    [facility] at [facility]    MASTECTOMY Bilateral 9/24/2018    Performed by Edgar [facility] at [facility]    RECONSTRUCTION, BREAST FAT TRANSFER RIGHT BREAST Right 1/17/2019    [facility]    RECONSTRUCTION, NIPPLE CRESCENT  LIFT LEFT NIPPLE Left 1/17/2019    [facility] at [facility]    SHUNT REVISION      multiple    THYROIDECTOMY, PARTIAL       History reviewed. No pertinent family history.  Social History     Tobacco Use    Smoking status: Never Smoker    Smokeless tobacco: Never Used   Substance Use Topics    Alcohol use: No    Drug use: Not on file     Review of Systems   Constitutional: Negative for activity change, appetite change, chills, fatigue and fever.   HENT: Negative for congestion, ear pain, rhinorrhea, sore throat and voice change.    Respiratory: Negative for cough, shortness of breath and wheezing.    Cardiovascular: Negative for chest pain.   Gastrointestinal: Positive for abdominal pain. Negative for blood in stool, constipation, diarrhea, nausea and vomiting.   Genitourinary: Negative for dysuria, flank pain, frequency and hematuria.   Musculoskeletal: Negative for back pain, neck pain and neck stiffness.   Skin: Negative for rash.   Neurological: Negative for dizziness, weakness, numbness and headaches.   All other systems reviewed and are negative.      Physical Exam     Initial Vitals   BP Pulse Resp Temp SpO2   -- -- -- -- --      MAP       --         Physical Exam    Nursing note and vitals reviewed.  Constitutional: She appears well-developed and well-nourished. She appears distressed (Mild.  Resting comfortably).   HENT:   Head: Normocephalic and atraumatic.   Nose: Nose normal.   Mouth/Throat: Oropharynx is clear and moist.   Eyes: Conjunctivae and EOM are normal. Pupils are equal, round, and reactive to light.   Neck: Normal range of motion. Neck supple.   Cardiovascular: Normal rate, regular rhythm, normal heart sounds and intact distal pulses.   Pulmonary/Chest: Breath sounds normal. No respiratory distress.   Abdominal: Soft. Bowel sounds are normal.   Tenderness to palpation in the bilateral lower quadrants with guarding.  No rebound tenderness. No palpable mass.    Musculoskeletal: She exhibits no edema.   Lymphadenopathy:     She has no cervical adenopathy.   Neurological: She is alert and oriented to person, place, and time. She has normal strength. No sensory deficit.   Skin: Skin is warm and dry. No rash noted.   Psychiatric: She has a normal mood and affect. Her behavior is normal. Judgment and thought content normal.         ED Course   Procedures  Labs Reviewed   CBC W/ AUTO DIFFERENTIAL - Abnormal; Notable for the following components:       Result Value    MCHC 30.7 (*)     All other components within normal limits   COMPREHENSIVE METABOLIC PANEL - Abnormal; Notable for the following components:    Alkaline Phosphatase 133 (*)     Anion Gap 5 (*)     All other components within normal limits   CULTURE, BLOOD   CULTURE, BLOOD   LIPASE   URINALYSIS, REFLEX TO URINE CULTURE    Narrative:     Preferred Collection Type->Urine, Clean Catch   LACTIC ACID, PLASMA          Imaging Results          CT Abdomen Pelvis With Contrast (Final result)  Result time 04/16/19 12:24:56    Final result by Dany Roche MD (04/16/19 12:24:56)                 Impression:      2.3 cm left ovarian cyst.  Small amount of fluid in the pelvis.  Shunt tubing in place without acute finding.  Colonic diverticulosis without evidence of diverticulitis.  Normal appendix.  Patient status post cholecystectomy.    All CT scans at this facility are performed  using dose modulation techniques as appropriate to performed exam including the following:  automated exposure control; adjustment of mA and/or kV according to the patients size (this includes techniques or standardized protocols for targeted exams where dose is matched to indication/reason for exam: i.e. extremities or head);  iterative reconstruction technique.      Electronically signed by: Dany Roche MD  Date:    04/16/2019  Time:    12:24             Narrative:    EXAMINATION:  CT ABDOMEN PELVIS WITH CONTRAST    CLINICAL HISTORY:  LLQ pain,  suspect diverticulitis;RLQ pain, appendicitis suspected;Patient with shunt with initial RLQ and now bilateral pain;    FINDINGS:  Lung bases are clear.    The liveris normal. The spleen appears normal. The gallbladder has been removed.    The pancreas is unremarkable.    Kidneys are unremarkable. The adrenal glands are normal.    2.3 cm left adnexal cyst.  Small amount of fluid in the pelvis.  Shunt tubing tip in the lower pelvis.  No adjacent fluid collection is identified.    Aorta is nondilated.  There is a filter in the inferior vena cava.    Scattered left colonic diverticula.  No CT evidence of diverticulitis.  The appendix appears normal.    The urinary bladder is unremarkable.    No significant osseous abnormality is identified.                                 Medical Decision Making:   Clinical Tests:   Lab Tests: Ordered and Reviewed  The following lab test(s) were unremarkable: CBC, CMP, Lipase and Urinalysis  Radiological Study: Ordered and Reviewed  Labs within normal limits.  Diverticulosis but no evidence of diverticulitis on CT of the abdomen and pelvis.  No evidence of infection around the shunt.  There is some free-fluid in the pelvis and a left ovarian cyst.  No evidence of any acute surgical abdomen.  No AAA.  Dr. Robin was also involved in the management of this patient and advised on the work-up and plan. Discussed findings with patient and her .  She will follow up with Dr. Moreno without fail. Return to the ED for severe pain, fever or worse in any way.                       Clinical Impression:       ICD-10-CM ICD-9-CM   1. Lower abdominal pain R10.30 789.09         Disposition:   Disposition: Discharged                        JENNIFER Lizama  04/16/19 [id number]

## 2019-04-21 LAB
BACTERIA BLD CULT: NORMAL
BACTERIA BLD CULT: NORMAL

## 2019-06-03 ENCOUNTER — OFFICE VISIT (OUTPATIENT)
Dept: OBSTETRICS AND GYNECOLOGY | Facility: CLINIC | Age: 64
End: 2019-06-03
Payer: COMMERCIAL

## 2019-06-03 VITALS — WEIGHT: 157 LBS | DIASTOLIC BLOOD PRESSURE: 80 MMHG | BODY MASS INDEX: 26.95 KG/M2 | SYSTOLIC BLOOD PRESSURE: 118 MMHG

## 2019-06-03 DIAGNOSIS — Z01.419 WELL WOMAN EXAM WITH ROUTINE GYNECOLOGICAL EXAM: Primary | ICD-10-CM

## 2019-06-03 DIAGNOSIS — Z85.3 HISTORY OF BREAST CANCER: ICD-10-CM

## 2019-06-03 DIAGNOSIS — N83.202 LEFT OVARIAN CYST: ICD-10-CM

## 2019-06-03 PROCEDURE — 99999 PR PBB SHADOW E&M-EST. PATIENT-LVL III: ICD-10-PCS | Mod: PBBFAC,,, | Performed by: OBSTETRICS & GYNECOLOGY

## 2019-06-03 PROCEDURE — 99386 PREV VISIT NEW AGE 40-64: CPT | Mod: S$GLB,,, | Performed by: OBSTETRICS & GYNECOLOGY

## 2019-06-03 PROCEDURE — 99999 PR PBB SHADOW E&M-EST. PATIENT-LVL III: CPT | Mod: PBBFAC,,, | Performed by: OBSTETRICS & GYNECOLOGY

## 2019-06-03 PROCEDURE — 99386 PR PREVENTIVE VISIT,NEW,40-64: ICD-10-PCS | Mod: S$GLB,,, | Performed by: OBSTETRICS & GYNECOLOGY

## 2019-06-03 PROCEDURE — 88175 CYTOPATH C/V AUTO FLUID REDO: CPT

## 2019-06-03 NOTE — PROGRESS NOTES
CC: Annual check-up    SUBJECTIVE:   64 y.o. female   for annual routine Pap and checkup. No LMP recorded. Patient is postmenopausal. Patient states she hasn't had a pap smear in >10 years, no history of abnormal pap. Denies weight loss, vaginal bleeding. Reports occasional LLQ pain that is sharp, lasts seconds and intermittent. No known worsening factors. Denies N/V. Doesn't take medication for the pain. States she was told she had findings of a R ruptured cyst. R pain has resolved.     Of note, patient had an 8mm invasive ductal carcinoma found on routine screening and has since had a double mastectomy . States they did not need to do radiation or chemo and was told she doesn't need mammograms anymore.     LMP around 56 yo, started around 10yo. OCP's most of life, one natural daughter and one adopted son. Tubal ligation around .     CT shows 2.3cm L ovarian cyst.     Past Medical History:   Diagnosis Date    Breast cancer     DVT (deep venous thrombosis)     Hydrocephalus     Thyroid nodule      Past Surgical History:   Procedure Laterality Date    BIOPSY, LYMPH NODE, SENTINEL Right 2018    Performed by Edgar [facility] at [facility] OR 2ND [facility]    BRAIN SURGERY      BREAST REVISION SURGERY Bilateral 2019    Performed by Donnie Wilde MD at [facility] OR [facility]    CHOLECYSTECTOMY      LISSY FILTER PLACEMENT      INJECTION, FOR SENTINEL NODE IDENTIFICATION Right 2018    Performed by Edgar [facility] MD at [facility] OR [facility]    INSERTION, BREAST IMPLANT Bilateral 2018    Performed by [facility] at [facility] OR [facility]    INSERTION, TISSUE EXPANDER, BREAST Bilateral 2018    Performed by [facility] MD at [facility]    MASTECTOMY Bilateral 2018    Performed by Edgar Eden MD at [facility]    RECONSTRUCTION, BREAST FAT TRANSFER RIGHT BREAST Right 2019    Performed by [facility] MD at [facility] OR [facility]  FLR    RECONSTRUCTION, NIPPLE CRESCENT LIFT LEFT NIPPLE Left 2019    [facility] 2ND [facility]    SHUNT REVISION      multiple    THYROIDECTOMY, PARTIAL       Social History     Socioeconomic History    Marital status:      Spouse name: Not on file    Number of children: Not on file    Years of education: Not on file    Highest education level: Not on file   Occupational History    Not on file   Social Needs    Financial resource strain: Not on file    Food insecurity:     Worry: Not on file     Inability: Not on file    Transportation needs:     Medical: Not on file     Non-medical: Not on file   Tobacco Use    Smoking status: Never Smoker    Smokeless tobacco: Never Used   Substance and Sexual Activity    Alcohol use: No    Drug use: Never    Sexual activity: Yes     Partners: Male     Birth control/protection: None   Lifestyle    Physical activity:     Days per week: Not on file     Minutes per session: Not on file    Stress: Not on file   Relationships    Social connections:     Talks on phone: Not on file     Gets together: Not on file     Attends Denominational service: Not on file     Active member of club or organization: Not on file     Attends meetings of clubs or organizations: Not on file     Relationship status: Not on file   Other Topics Concern    Not on file   Social History Narrative    Not on file     History reviewed. No pertinent family history.  OB History    Para Term  AB Living   1 1 0 1       SAB TAB Ectopic Multiple Live Births           1      # Outcome Date GA Lbr Ulises/2nd Weight Sex Delivery Anes PTL Lv   1  84 34w0d   F    DEC         Current Outpatient Medications   Medication Sig Dispense Refill    esomeprazole magnesium (NEXIUM) 10 mg GrPS Take 10 mg by mouth before breakfast.       No current facility-administered medications for this visit.      Allergies: Penicillins     ROS:  Constitutional: no  weight loss, weight gain, fever, fatigue  Eyes:  No vision changes, glasses/contacts  ENT/Mouth: No ulcers, sinus problems, ears ringing, headache  Cardiovascular: No inability to lie flat, chest pain, exercise intolerance, swelling, heart palpitations  Respiratory: No wheezing, coughing blood, shortness of breath, or cough  Gastrointestinal: No diarrhea, bloody stool, nausea/vomiting, constipation, gas, hemorrhoids  Genitourinary: No blood in urine, painful urination, urgency of urination, frequency of urination, incomplete emptying, incontinence, abnormal bleeding, painful periods, heavy periods, vaginal discharge, vaginal odor, painful intercourse, sexual problems, bleeding after intercourse.  Musculoskeletal: No muscle weakness  Skin/Breast: No painful breasts, nipple discharge, masses, rash, ulcers  Neurological: No passing out, seizures, numbness, headache  Endocrine: No diabetes, hypothyroid, hyperthyroid, hot flashes, hair loss, abnormal hair growth, ance  Psychiatric: No depression, crying  Hematologic: No bruises, bleeding, swollen lymph nodes, anemia.    OBJECTIVE:   The patient appears well, alert, oriented x 3, in no distress.  /80   Wt 71.2 kg (157 lb)   BMI 26.95 kg/m²   NECK: no thyromegaly, trachea midline, well healed scar  SKIN: no acne, striae, hirsutism  BREAST EXAM: deferred due to lack of breast tissue   ABDOMEN: soft, non-tender; bowel sounds normal; no masses,  no organomegaly and no hernias, masses, or hepatosplenomegaly  GENITALIA: normal external genitalia, no erythema, no discharge  URETHRA: normal urethra, normal urethral meatus  VAGINA: mucosal atrophy  CERVIX: no lesions or cervical motion tenderness  UTERUS: normal and tender on motion  ADNEXA: no mass, fullness, tenderness and tender in bilateral adnexa    ASSESSMENT:   well woman  1. Well woman exam with routine gynecological exam    2. Left ovarian cyst    3. History of breast cancer      PLAN:   pap smear  Monitor ovarian  cyst  return after u/s  Orders Placed This Encounter    US Pelvis Complete Non OB    Liquid-based pap smear, screening

## 2019-06-04 ENCOUNTER — HOSPITAL ENCOUNTER (OUTPATIENT)
Dept: RADIOLOGY | Facility: HOSPITAL | Age: 64
Discharge: HOME | End: 2019-06-04
Attending: OBSTETRICS & GYNECOLOGY
Payer: COMMERCIAL

## 2019-06-04 DIAGNOSIS — N83.202 LEFT OVARIAN CYST: ICD-10-CM

## 2019-06-04 PROCEDURE — 76830 TRANSVAGINAL US NON-OB: CPT | Mod: TC,PO

## 2019-06-05 ENCOUNTER — OFFICE VISIT (OUTPATIENT)
Dept: OBSTETRICS AND GYNECOLOGY | Facility: CLINIC | Age: 64
End: 2019-06-05
Payer: COMMERCIAL

## 2019-06-05 VITALS — WEIGHT: 157 LBS | BODY MASS INDEX: 26.95 KG/M2 | SYSTOLIC BLOOD PRESSURE: 122 MMHG | DIASTOLIC BLOOD PRESSURE: 84 MMHG

## 2019-06-05 DIAGNOSIS — N83.209 CYST OF OVARY, UNSPECIFIED LATERALITY: Primary | ICD-10-CM

## 2019-06-05 PROCEDURE — 3008F BODY MASS INDEX DOCD: CPT | Mod: CPTII,S$GLB,, | Performed by: OBSTETRICS & GYNECOLOGY

## 2019-06-05 PROCEDURE — 99999 PR PBB SHADOW E&M-EST. PATIENT-LVL III: CPT | Mod: PBBFAC,,, | Performed by: OBSTETRICS & GYNECOLOGY

## 2019-06-05 PROCEDURE — 3008F PR BODY MASS INDEX (BMI) DOCUMENTED: ICD-10-PCS | Mod: CPTII,S$GLB,, | Performed by: OBSTETRICS & GYNECOLOGY

## 2019-06-05 PROCEDURE — 99213 OFFICE O/P EST LOW 20 MIN: CPT | Mod: S$GLB,,, | Performed by: OBSTETRICS & GYNECOLOGY

## 2019-06-05 PROCEDURE — 99999 PR PBB SHADOW E&M-EST. PATIENT-LVL III: ICD-10-PCS | Mod: PBBFAC,,, | Performed by: OBSTETRICS & GYNECOLOGY

## 2019-06-05 PROCEDURE — 99213 PR OFFICE/OUTPT VISIT, EST, LEVL III, 20-29 MIN: ICD-10-PCS | Mod: S$GLB,,, | Performed by: OBSTETRICS & GYNECOLOGY

## 2019-06-05 NOTE — PROGRESS NOTES
CC:  Chief Complaint   Patient presents with    Follow-up       HPI:    64 y.o. OB History        1    Para   1    Term   0       1    AB        Living           SAB        TAB        Ectopic        Multiple        Live Births   1               F/u u/s        (Not in a hospital admission)    Review of patient's allergies indicates:   Allergen Reactions    Penicillins Other (See Comments)     Yeast infection        Past Medical History:   Diagnosis Date    Breast cancer     DVT (deep venous thrombosis)     Hydrocephalus     Thyroid nodule      Past Surgical History:   Procedure Laterality Date    BIOPSY, LYMPH NODE, SENTINEL Right 2018    Performed by Edgar [facility] MD at [facility] OR 2ND [facility]    BRAIN SURGERY      BREAST REVISION SURGERY Bilateral 2019    Performed by [facility] MD at [facility] OR [facility]    CHOLECYSTECTOMY      LISSY FILTER PLACEMENT      INJECTION, FOR SENTINEL NODE IDENTIFICATION Right 2018    Performed by Edgar Eden MD at [facility] OR [facility]    INSERTION, BREAST IMPLANT Bilateral 2018    Performed by [facility] MD at [facility] OR [facility]    INSERTION, TISSUE EXPANDER, BREAST Bilateral 2018    Performed by [facility] Wil[facility] MD at [facility] OR [facility]    MASTECTOMY Bilateral 2018    Performed by Edgar Eden MD at [facility] OR 2ND [facility]    RECONSTRUCTION, BREAST FAT TRANSFER RIGHT BREAST Right 2019    Performed by [facility] MD at [facility] OR [facility]    RECONSTRUCTION, NIPPLE CRESCENT LIFT LEFT NIPPLE Left 2019    Performed by [facility] MD at [facility] OR [facility]    SHUNT REVISION      multiple    THYROIDECTOMY, PARTIAL       History reviewed. No pertinent family history.  Social History     Tobacco Use    Smoking status: Never Smoker    Smokeless tobacco: Never Used   Substance Use Topics    Alcohol use: No    Drug use: Never     ROS:  GENERAL: Feeling well overall. Denies fever  or chills.   SKIN: Denies rash or lesions.   HEAD: Denies head injury or headache.   NODES: Denies enlarged lymph nodes.   CHEST: Denies chest pain or shortness of breath.   CARDIOVASCULAR: Denies palpitations or left sided chest pain.    ABDOMEN: Denies diarrhea, nausea, vomiting or rectal bleeding.   URINARY: No dysuria, hematuria, or burning on urination.  REPRODUCTIVE: See HPI.   BREASTS: Denies pain, lumps, or nipple discharge.   HEMATOLOGIC: No easy bruisability or excessive bleeding.   MUSCULOSKELETAL: Denies joint pain or swelling.   NEUROLOGIC: Denies syncope or weakness.   PSYCHIATRIC: Denies depression, anxiety or mood swings.      PE: /84   Wt 71.2 kg (157 lb)   BMI 26.95 kg/m²      APPEARANCE: Well nourished, well developed, in no acute distress.  SKIN: Normal skin turgor, no lesions.    EXAMINATION:  CT ABDOMEN PELVIS WITH CONTRAST    CLINICAL HISTORY:  LLQ pain, suspect diverticulitis;RLQ pain, appendicitis suspected;Patient with shunt with initial RLQ and now bilateral pain;    FINDINGS:  Lung bases are clear.    The liveris normal. The spleen appears normal. The gallbladder has been removed.    The pancreas is unremarkable.    Kidneys are unremarkable. The adrenal glands are normal.    2.3 cm left adnexal cyst.  Small amount of fluid in the pelvis.  Shunt tubing tip in the lower pelvis.  No adjacent fluid collection is identified.    Aorta is nondilated.  There is a filter in the inferior vena cava.    Scattered left colonic diverticula.  No CT evidence of diverticulitis.  The appendix appears normal.    The urinary bladder is unremarkable.    No significant osseous abnormality is identified.      Impression       2.3 cm left ovarian cyst.  Small amount of fluid in the pelvis.  Shunt tubing in place without acute finding.  Colonic diverticulosis without evidence of diverticulitis.  Normal appendix.  Patient status post cholecystectomy.    All CT scans at this facility are performed  using  dose modulation techniques as appropriate to performed exam including the following:  automated exposure control; adjustment of mA and/or kV according to the patients size (this includes techniques or standardized protocols for targeted exams where dose is matched to indication/reason for exam: i.e. extremities or head);  iterative reconstruction technique.      Electronically signed by: Dany Roche MD  Date: 04/16/2019  Time: 12:24       Narrative     EXAMINATION:  US PELVIS COMP WITH TRANSVAG NON-OB (XPD)    CLINICAL HISTORY:  Unspecified ovarian cyst, left side    TECHNIQUE:  Transabdominal sonography of the pelvis was performed, followed by transvaginal sonography to better evaluate the uterus and ovaries.    COMPARISON:  None.    FINDINGS:  Uterus:    Size: 6.7 x 3.5 x 4.6 cm    Masses: Multiple hypoechoic and isoechoic masses of the uterus are identified most consistent with uterine fibroids.  Largest measures 2.5 cm in size, at the left body of the uterus.    Endometrium: Normal in this premenopausal patient, measuring 3 mm.    Right ovary:    Size: 1.8 by 1.4 x 1.1 cm    Appearance: Normal    Vascular flow: Normal.    Left ovary:    Size: 3.1 x 2.1 x 2.3 cm    Appearance: A 2.0 cm simple appearing left ovarian cyst is present.    Vascular Flow: Normal.    Free Fluid:    None.      Impression       1. Uterine masses most consistent with fibroids.  The largest measures 2.5 cm.  2. 2.0 cm simple appearing left ovarian cyst.      Electronically signed by: Wilian Donovan  Date: 06/04/2019  Time: 15:32       ASSESSMENT/ PLAN    Ira was seen today for follow-up.    Diagnoses and all orders for this visit:    Cyst of ovary, unspecified laterality  -     US Pelvis Comp with Transvag NON-OB (xpd; Future      Discussed u/s findings risks of ovarian/uterine CA and pt prefers to proceed with conservative monitoring with u/s q6months and will call with changes in clinical status      Dany Vieira MD

## 2019-06-11 ENCOUNTER — TELEPHONE (OUTPATIENT)
Dept: OBSTETRICS AND GYNECOLOGY | Facility: CLINIC | Age: 64
End: 2019-06-11

## 2019-06-11 NOTE — TELEPHONE ENCOUNTER
----- Message from Anna Cohen sent at 6/10/2019  3:13 PM CDT -----  Contact: Self [phone]  TEST RESULTS:   Patient would like to get test results.  Name of test (lab, mammo, etc.): Pap   Date of test: 6/3/19       
Called patient, patient stated she was calling to get results from her pap smear. Informed patient no news is good news. I informed patient someone from the office would call if anything was abnormal. Patient verbalized understanding.  
Yes

## 2019-06-11 NOTE — TELEPHONE ENCOUNTER
Called patient, patient stated she was calling to get results from her pap smear. Informed patient no news is good news. I informed patient someone from the office would call if anything was abnormal. Patient verbalized understanding.

## 2019-06-11 NOTE — TELEPHONE ENCOUNTER
----- Message from Margareth Kaur sent at 6/11/2019 10:32 AM CDT -----  Contact: self [phone]  Patient would like to speak with you about getting her pap test results. Patient called yesterday and still has not received a call back Please advise.

## 2019-06-12 ENCOUNTER — TELEPHONE (OUTPATIENT)
Dept: OBSTETRICS AND GYNECOLOGY | Facility: CLINIC | Age: 64
End: 2019-06-12

## 2019-06-12 NOTE — TELEPHONE ENCOUNTER
Called patient, no answer, left message informing patient it looks like her results were released to  patient portal.

## 2019-06-12 NOTE — TELEPHONE ENCOUNTER
----- Message from Jacinda Wilkes sent at 6/12/2019 12:12 PM CDT -----  Contact: self / [phone]  Patient is requesting a call back regarding, needs her pap smear results on her my [facility]. Please advise

## 2019-06-12 NOTE — TELEPHONE ENCOUNTER
----- Message from Nazia Iniguez sent at 6/12/2019 12:41 PM CDT -----  Contact: Self/ [phone]  Patient called to let you know her test results are not in her portal.    Please call.

## 2019-06-24 ENCOUNTER — PATIENT MESSAGE (OUTPATIENT)
Dept: OBSTETRICS AND GYNECOLOGY | Facility: CLINIC | Age: 64
End: 2019-06-24

## 2019-09-05 ENCOUNTER — TELEPHONE (OUTPATIENT)
Dept: SURGERY | Facility: CLINIC | Age: 64
End: 2019-09-05

## 2019-09-05 NOTE — TELEPHONE ENCOUNTER
Contacted the patient regarding the message below. Patient did not answer, left a voicemail to call me back on my direct line also, left the [facility] breast center line.      ----- Message from JENNIFER Pruett sent at 9/5/2019 11:41 AM CDT -----  Please schedule this patient to follow up with me on a Tuesday or Thursday morning when Dr. Eden is here (since it's his patient he will probably want to see her too).  She needs a breast exam and we will go over her cancer treatment summary then as well or we can mail it to her.    ~Carmen Mcleod PA-C

## 2019-09-09 ENCOUNTER — TELEPHONE (OUTPATIENT)
Dept: SURGERY | Facility: CLINIC | Age: 64
End: 2019-09-09

## 2019-09-09 NOTE — TELEPHONE ENCOUNTER
Patient contacted me back regarding the message. I scheduled the patient cbe appointment for 9/19/19 @ 9:15am. Patient voiced understanding of date, time and location.        ----- Message from JENNIFER Pruett sent at 9/5/2019 11:41 AM CDT -----  Please schedule this patient to follow up with me on a Tuesday or Thursday morning when Dr. Eden is here (since it's his patient he will probably want to see her too).  She needs a breast exam and we will go over her cancer treatment summary then as well or we can mail it to her.    ~Carmen Mcleod PA-C

## 2019-09-11 ENCOUNTER — PATIENT MESSAGE (OUTPATIENT)
Dept: PLASTIC SURGERY | Facility: CLINIC | Age: 64
End: 2019-09-11

## 2019-09-19 ENCOUNTER — OFFICE VISIT (OUTPATIENT)
Dept: SURGERY | Facility: CLINIC | Age: 64
End: 2019-09-19
Payer: COMMERCIAL

## 2019-09-19 ENCOUNTER — TELEPHONE (OUTPATIENT)
Dept: SURGERY | Facility: CLINIC | Age: 64
End: 2019-09-19

## 2019-09-19 ENCOUNTER — HOSPITAL ENCOUNTER (OUTPATIENT)
Dept: RADIOLOGY | Facility: HOSPITAL | Age: 64
Discharge: HOME | End: 2019-09-19
Attending: NURSE PRACTITIONER
Payer: COMMERCIAL

## 2019-09-19 VITALS
TEMPERATURE: 99 F | SYSTOLIC BLOOD PRESSURE: 135 MMHG | BODY MASS INDEX: 26.29 KG/M2 | DIASTOLIC BLOOD PRESSURE: 79 MMHG | HEIGHT: 64 IN | HEART RATE: 75 BPM | WEIGHT: 154 LBS

## 2019-09-19 DIAGNOSIS — N63.10 BILATERAL BREAST LUMP: ICD-10-CM

## 2019-09-19 DIAGNOSIS — R07.89 CHEST WALL PAIN: ICD-10-CM

## 2019-09-19 DIAGNOSIS — N63.20 BILATERAL BREAST LUMP: ICD-10-CM

## 2019-09-19 DIAGNOSIS — Z85.3 PERSONAL HISTORY OF BREAST CANCER: Primary | ICD-10-CM

## 2019-09-19 PROCEDURE — 3008F PR BODY MASS INDEX (BMI) DOCUMENTED: ICD-10-PCS | Mod: CPTII,S$GLB,, | Performed by: NURSE PRACTITIONER

## 2019-09-19 PROCEDURE — 99999 PR PBB SHADOW E&M-EST. PATIENT-LVL IV: ICD-10-PCS | Mod: PBBFAC,,, | Performed by: NURSE PRACTITIONER

## 2019-09-19 PROCEDURE — 99999 PR PBB SHADOW E&M-EST. PATIENT-LVL IV: CPT | Mod: PBBFAC,,, | Performed by: NURSE PRACTITIONER

## 2019-09-19 PROCEDURE — 99213 PR OFFICE/OUTPT VISIT, EST, LEVL III, 20-29 MIN: ICD-10-PCS | Mod: S$GLB,,, | Performed by: NURSE PRACTITIONER

## 2019-09-19 PROCEDURE — 77066 MAMMO DIGITAL DIAGNOSTIC BILAT WITH TOMOSYNTHESIS_CAD: ICD-10-PCS | Mod: 26,,, | Performed by: RADIOLOGY

## 2019-09-19 PROCEDURE — 77062 BREAST TOMOSYNTHESIS BI: CPT | Mod: 26,,, | Performed by: RADIOLOGY

## 2019-09-19 PROCEDURE — 77062 BREAST TOMOSYNTHESIS BI: CPT | Mod: TC,PO

## 2019-09-19 PROCEDURE — 3008F BODY MASS INDEX DOCD: CPT | Mod: CPTII,S$GLB,, | Performed by: NURSE PRACTITIONER

## 2019-09-19 PROCEDURE — 99213 OFFICE O/P EST LOW 20 MIN: CPT | Mod: S$GLB,,, | Performed by: NURSE PRACTITIONER

## 2019-09-19 PROCEDURE — 77062 MAMMO DIGITAL DIAGNOSTIC BILAT WITH TOMOSYNTHESIS_CAD: ICD-10-PCS | Mod: 26,,, | Performed by: RADIOLOGY

## 2019-09-19 PROCEDURE — 77066 DX MAMMO INCL CAD BI: CPT | Mod: 26,,, | Performed by: RADIOLOGY

## 2019-09-19 NOTE — TELEPHONE ENCOUNTER
Contacted the patient to reschedule her appt on today per JENNIFER Steiner. Patient voiced that she was already in route to the clinic can another provider see her? Asked Arian Stinson NP if she could see the pt this morning, she stated yes! Ms. Thomas rescheduled her to see Arian on today.

## 2019-09-19 NOTE — PROGRESS NOTES
Patient ID: Ira Novoa is a 64 y.o. female.    Chief Complaint: Follow-up (follow up CBE )        HPI:  Established patient of the Department of Breast Surgery, previously seen by FIDELINA De Luna, and new to me, presents today for breast cancer surveillance.    Breast History:    9/2018 bilat NSMs with R IDC and DCIS and negative SLNB, L breast negative for malignancy    Cancer was ER+CO- and was HER- by FISH    Underwent implant reconstruction with Dr. Wilde    XRT:  No   Chemo:  No   Endocrine therapy:  No, decided against    Breast Imaging    No history of breast imaging since bilateral mastectomies.    Interval Breast History    LEFT breast 12oc region, lateral region, and superior UOQ pain-  began years before RIGHT breast cancer diagnosis.    Patient denies palpable breast mass, breast-related skin changes, nipple itching, nipple discharge and nipple retraction.    GYN History:  HRT usage:  never   No biological children    Other Oncologic History:  Personal history of other cancer not abovementioned:  no  Personal history of genetic testing:  no    Family Oncologic History:    Ashkenazi Baptist ancestry:  no  History of genetic testing in relatives:  no    Family History   Problem Relation Age of Onset    Leukemia Paternal Cousin     Breast cancer Neg Hx     Ovarian cancer Neg Hx            Review of Systems - See HPI for breast ROS.  Constitutional:  Negative for unexplained weight loss.  Respiratory:  Negative for cough, hemoptysis, or dyspnea.  Musculoskeletal:  Negative for bone pain.    Negative for decreased range of motion to either upper extremity.  Neurological:  Negative for new, worsening, or changing headaches.  Gastrointestinal:  Patient states she has a left ovarian cyst and a presumed burst right ovarian cyst, and these caused onset of RLQ abdominal pain about 6 months ago; intermittent; improving, not worsening; she did undergo a CT scan for this. Negative for other abdominal  pain.  No jaundice, yellowing of skin/eyes, or pruritus.  Cardiovascular:  Negative for chest pain.  Hematologic:  Negative for upper extremity lymphedema.  Other:  Negative for other concerns for breast cancer metastasis.  Objective:   Physical Exam   Pulmonary/Chest: Effort normal. No respiratory distress. She exhibits no mass, no edema and no deformity. Right breast exhibits mass and tenderness. Right breast exhibits no inverted nipple, no nipple discharge and no skin change. Left breast exhibits mass and tenderness. Left breast exhibits no inverted nipple, no nipple discharge and no skin change. No breast swelling. Breasts are symmetrical.   Various areas of bilateral reconstructed breasts are TTP with no associated abnormality appreciated aside from below-mentioned; TTP independently not suggestive of pathology.  Unable to elicit discharge from either nipple.  R breast 6-8oc/LOQ about 0.1-2cmfn with ~2.5cm thickening; flat, smooth, TTP; believed to be most consistent with scar tissue.  R breast 10-11oc about 7-8cmfn with 3-3.5cm x 2cm nodularity; area is mobile, smooth, TTP; believed to be most consistent with fatty tissue from fat-grafting vs oil cyst.  L breast LIQ areolar region with 8-10mm lump; smooth, TTP; believed to be most consistent with scar tissue vs oil cyst.  L breast 12oc region about 9cmfn with nodularity, most prominently a 5-6mm lump; smooth, non-tender; believed to be most consistent with fat necrosis given its location along the outer border of the breast.   Genitourinary: No breast swelling.   Lymphadenopathy:     She has no cervical adenopathy.     She has no axillary adenopathy.        Right: No supraclavicular adenopathy present.        Left: No supraclavicular adenopathy present.      She has no right or left infraclavicular adenopathy.   Assessment:       1. Personal history of breast cancer    2. Bilateral breast lump    3. Chest wall pain          Counseling / Medical  Decision-Making:     Provided pt with pricing line number for her to call to check price of breast imaging prior to proceeding.    Abdominal pain not suggestive of breast cancer metastasis to liver/abdomen given likely explained by cyst and also improving.  Pt to follow up with relevant providers regarding this and RTC in 3 months here for reevaluation.  Advised pt to return sooner with any worsening.    Cardiology and Orthopedics referrals placed for left breast/chest wall pain, as pt states she feels this may be muscular, and it was present prior to her mastectomy and continues.  Not suggestive of breast-related pathology given its course.  To be reassessed in 3 months.  Provided pt with numbers for her to call to set up referral appointments.  Advised pt to go to ER with any chest pain.    Discussed genetic testing and rationale for it.  Pt not interested.  Advised her to notify me if desires to proceed.    Encouraged breast awareness, including monthly breast self-exams.  Advised patient to RTC with any interval changes or concerns.  Questions were encouraged and answered to patient's satisfaction, and patient verbalized understanding of information and agreement with the plan.   Plan:     [facility] US today for R 6-8oc and 10-11oc and L LIQ and 12oc.  All clinical findings of low clinical suspicion for pathology.    RTC in 3 months for follow-up.

## 2019-09-25 ENCOUNTER — TELEPHONE (OUTPATIENT)
Dept: SURGERY | Facility: CLINIC | Age: 64
End: 2019-09-25

## 2019-10-18 ENCOUNTER — TELEPHONE (OUTPATIENT)
Dept: SURGERY | Facility: CLINIC | Age: 64
End: 2019-10-18

## 2019-10-28 ENCOUNTER — TELEPHONE (OUTPATIENT)
Dept: ORTHOPEDICS | Facility: CLINIC | Age: 64
End: 2019-10-28

## 2019-10-28 NOTE — TELEPHONE ENCOUNTER
Ortho Referral: [id number]  LVM requesting pt return call regarding Ortho appt for chest wall pain per HAYLEY StinsonNP/[facility] referral.

## 2019-11-08 ENCOUNTER — TELEPHONE (OUTPATIENT)
Dept: ORTHOPEDICS | Facility: CLINIC | Age: 64
End: 2019-11-08

## 2019-11-08 NOTE — TELEPHONE ENCOUNTER
Ortho Referral: [id number]  Left second  requesting pt return call regarding Ortho appt for chest wall pain per HAYLEY Stinson.NP/[facility] referral.

## 2019-11-15 ENCOUNTER — TELEPHONE (OUTPATIENT)
Dept: ORTHOPEDICS | Facility: CLINIC | Age: 64
End: 2019-11-15

## 2019-11-15 NOTE — TELEPHONE ENCOUNTER
"Ortho Referral Follow Up: 1527  Party who answered at pt number stated pt "is not available" and call disconnected. LVM at Patient Contact, Dany Novoa, to have pt call back if she would like to schedule an Ortho appt for chest wall pain per HAYLEY Stinson NP/[facility] referral.     "

## 2019-12-12 NOTE — ASSESSMENT & PLAN NOTE
63F w/ new dx of R invasive ductal cancer with ER+CO-, HER2-, and chronic left breast pain. Planned for bilateral nipple sparing mastectomies    Plan:  -plan for direct to implant reconstruction  -discussed risks and alternative options at clinic appointment, including TE, no reconstruction, autologous tissue, patient desires implant based reconstruction.   -also discussed possibility of nipple necrosis, infection, bleeding, need for future surgeries      
63F w/ new dx of R invasive ductal cancer with ER+NC-, HER2-, and chronic left breast pain. Planned for bilateral nipple sparing mastectomies-now POD#1.    Plan:  -POD#1 from direct to implant reconstruction  -Continue cares per primary care team  -Will d/c on 10 day course of clindamycin, 300mg, tid  -continue current pain control regimen as inpatient  -continue wound vacs and drains; continue educating patient on drain care      
DISPLAY PLAN FREE TEXT

## 2020-04-07 DIAGNOSIS — R05.9 COUGH: Primary | ICD-10-CM

## 2020-04-09 ENCOUNTER — TELEPHONE (OUTPATIENT)
Dept: UROLOGY | Facility: CLINIC | Age: 65
End: 2020-04-09

## 2020-09-23 ENCOUNTER — HOSPITAL ENCOUNTER (OUTPATIENT)
Dept: RADIOLOGY | Facility: HOSPITAL | Age: 65
Discharge: HOME | End: 2020-09-23
Attending: FAMILY MEDICINE
Payer: COMMERCIAL

## 2020-09-23 DIAGNOSIS — R07.81 RIB PAIN: ICD-10-CM

## 2020-09-23 DIAGNOSIS — M54.6 THORACIC SPINE PAIN: ICD-10-CM

## 2020-09-23 PROCEDURE — 71100 X-RAY EXAM RIBS UNI 2 VIEWS: CPT | Mod: TC,FY,PO,RT

## 2020-09-23 PROCEDURE — 72070 X-RAY EXAM THORAC SPINE 2VWS: CPT | Mod: TC,FY,PO

## 2021-01-20 DIAGNOSIS — Z20.822 ENCOUNTER FOR LABORATORY TESTING FOR COVID-19 VIRUS: Primary | ICD-10-CM

## 2021-04-27 ENCOUNTER — HOSPITAL ENCOUNTER (OUTPATIENT)
Dept: RADIOLOGY | Facility: HOSPITAL | Age: 66
Discharge: HOME | End: 2021-04-27
Attending: FAMILY MEDICINE
Payer: COMMERCIAL

## 2021-04-27 DIAGNOSIS — M79.632 LEFT FOREARM PAIN: ICD-10-CM

## 2021-04-27 DIAGNOSIS — R52 PAIN: ICD-10-CM

## 2021-04-27 PROCEDURE — 73060 X-RAY EXAM OF HUMERUS: CPT | Mod: TC,FY,PO,LT

## 2021-04-27 PROCEDURE — 73090 X-RAY EXAM OF FOREARM: CPT | Mod: TC,FY,PO,LT

## 2021-06-22 ENCOUNTER — HOSPITAL ENCOUNTER (EMERGENCY)
Facility: HOSPITAL | Age: 66
Discharge: HOME | End: 2021-06-22
Attending: EMERGENCY MEDICINE
Payer: COMMERCIAL

## 2021-06-22 VITALS
BODY MASS INDEX: 25.78 KG/M2 | DIASTOLIC BLOOD PRESSURE: 90 MMHG | HEIGHT: 64 IN | OXYGEN SATURATION: 97 % | TEMPERATURE: 98 F | RESPIRATION RATE: 18 BRPM | HEART RATE: 85 BPM | WEIGHT: 151 LBS | SYSTOLIC BLOOD PRESSURE: 131 MMHG

## 2021-06-22 DIAGNOSIS — S80.861A INSECT BITE OF RIGHT LOWER LEG, INITIAL ENCOUNTER: Primary | ICD-10-CM
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
## 2021-06-22 DIAGNOSIS — L03.90 CELLULITIS, UNSPECIFIED CELLULITIS SITE: ICD-10-CM

## 2021-06-22 PROCEDURE — 99284 EMERGENCY DEPT VISIT MOD MDM: CPT | Mod: ER

## 2021-06-22 RX ORDER — CEPHALEXIN 500 MG/1
500 CAPSULE ORAL 4 TIMES DAILY
Qty: 20 CAPSULE | Refills: 0 | Status: SHIPPED | OUTPATIENT
Start: 2021-06-22 | End: 2021-06-27

## 2021-06-22 RX ORDER — TOBRAMYCIN AND DEXAMETHASONE 3; 1 MG/ML; MG/ML
SUSPENSION/ DROPS OPHTHALMIC
COMMUNITY
Start: 2021-03-26 | End: 2022-09-01

## 2021-06-22 RX ORDER — CETIRIZINE HYDROCHLORIDE 10 MG/1
10 TABLET ORAL DAILY
Qty: 30 TABLET | Refills: 0 | Status: SHIPPED | OUTPATIENT
Start: 2021-06-22 | End: 2022-09-01

## 2021-06-22 RX ORDER — TOBRAMYCIN AND DEXAMETHASONE 3; 1 MG/ML; MG/ML
SUSPENSION/ DROPS OPHTHALMIC
COMMUNITY
End: 2022-09-01

## 2021-06-22 RX ORDER — FLUCONAZOLE 200 MG/1
200 TABLET ORAL DAILY
Qty: 1 TABLET | Refills: 0 | Status: SHIPPED | OUTPATIENT
Start: 2021-06-22 | End: 2021-06-23

## 2021-06-22 RX ORDER — PREDNISONE 20 MG/1
20 TABLET ORAL DAILY
COMMUNITY
Start: 2021-03-30 | End: 2022-09-01

## 2021-06-22 RX ORDER — CIPROFLOXACIN 500 MG/1
TABLET ORAL
COMMUNITY
End: 2022-09-01

## 2021-07-08 ENCOUNTER — TELEPHONE (OUTPATIENT)
Dept: NEUROLOGY | Facility: CLINIC | Age: 66
End: 2021-07-08

## 2022-08-08 DIAGNOSIS — N95.1 MENOPAUSAL STATE: Primary | ICD-10-CM

## 2022-09-01 ENCOUNTER — OFFICE VISIT (OUTPATIENT)
Dept: OBSTETRICS AND GYNECOLOGY | Facility: CLINIC | Age: 67
End: 2022-09-01
Payer: COMMERCIAL

## 2022-09-01 VITALS
SYSTOLIC BLOOD PRESSURE: 133 MMHG | WEIGHT: 156.94 LBS | BODY MASS INDEX: 26.94 KG/M2 | DIASTOLIC BLOOD PRESSURE: 84 MMHG

## 2022-09-01 DIAGNOSIS — Z01.419 WELL WOMAN EXAM WITH ROUTINE GYNECOLOGICAL EXAM: Primary | ICD-10-CM

## 2022-09-01 PROCEDURE — 3075F SYST BP GE 130 - 139MM HG: CPT | Mod: CPTII,S$GLB,, | Performed by: OBSTETRICS & GYNECOLOGY

## 2022-09-01 PROCEDURE — 99387 INIT PM E/M NEW PAT 65+ YRS: CPT | Mod: S$GLB,,, | Performed by: OBSTETRICS & GYNECOLOGY

## 2022-09-01 PROCEDURE — 99999 PR PBB SHADOW E&M-EST. PATIENT-LVL III: CPT | Mod: PBBFAC,,, | Performed by: OBSTETRICS & GYNECOLOGY

## 2022-09-01 PROCEDURE — 3079F DIAST BP 80-89 MM HG: CPT | Mod: CPTII,S$GLB,, | Performed by: OBSTETRICS & GYNECOLOGY

## 2022-09-01 PROCEDURE — 1160F RVW MEDS BY RX/DR IN RCRD: CPT | Mod: CPTII,S$GLB,, | Performed by: OBSTETRICS & GYNECOLOGY

## 2022-09-01 PROCEDURE — 3288F FALL RISK ASSESSMENT DOCD: CPT | Mod: CPTII,S$GLB,, | Performed by: OBSTETRICS & GYNECOLOGY

## 2022-09-01 PROCEDURE — 99387 PR PREVENTIVE VISIT,NEW,65 & OVER: ICD-10-PCS | Mod: S$GLB,,, | Performed by: OBSTETRICS & GYNECOLOGY

## 2022-09-01 PROCEDURE — 3008F BODY MASS INDEX DOCD: CPT | Mod: CPTII,S$GLB,, | Performed by: OBSTETRICS & GYNECOLOGY

## 2022-09-01 PROCEDURE — 1159F MED LIST DOCD IN RCRD: CPT | Mod: CPTII,S$GLB,, | Performed by: OBSTETRICS & GYNECOLOGY

## 2022-09-01 PROCEDURE — 3079F PR MOST RECENT DIASTOLIC BLOOD PRESSURE 80-89 MM HG: ICD-10-PCS | Mod: CPTII,S$GLB,, | Performed by: OBSTETRICS & GYNECOLOGY

## 2022-09-01 PROCEDURE — 3288F PR FALLS RISK ASSESSMENT DOCUMENTED: ICD-10-PCS | Mod: CPTII,S$GLB,, | Performed by: OBSTETRICS & GYNECOLOGY

## 2022-09-01 PROCEDURE — 88175 CYTOPATH C/V AUTO FLUID REDO: CPT | Performed by: OBSTETRICS & GYNECOLOGY

## 2022-09-01 PROCEDURE — 99999 PR PBB SHADOW E&M-EST. PATIENT-LVL III: ICD-10-PCS | Mod: PBBFAC,,, | Performed by: OBSTETRICS & GYNECOLOGY

## 2022-09-01 PROCEDURE — 1101F PR PT FALLS ASSESS DOC 0-1 FALLS W/OUT INJ PAST YR: ICD-10-PCS | Mod: CPTII,S$GLB,, | Performed by: OBSTETRICS & GYNECOLOGY

## 2022-09-01 PROCEDURE — 1101F PT FALLS ASSESS-DOCD LE1/YR: CPT | Mod: CPTII,S$GLB,, | Performed by: OBSTETRICS & GYNECOLOGY

## 2022-09-01 PROCEDURE — 3008F PR BODY MASS INDEX (BMI) DOCUMENTED: ICD-10-PCS | Mod: CPTII,S$GLB,, | Performed by: OBSTETRICS & GYNECOLOGY

## 2022-09-01 PROCEDURE — 3075F PR MOST RECENT SYSTOLIC BLOOD PRESS GE 130-139MM HG: ICD-10-PCS | Mod: CPTII,S$GLB,, | Performed by: OBSTETRICS & GYNECOLOGY

## 2022-09-01 PROCEDURE — 1159F PR MEDICATION LIST DOCUMENTED IN MEDICAL RECORD: ICD-10-PCS | Mod: CPTII,S$GLB,, | Performed by: OBSTETRICS & GYNECOLOGY

## 2022-09-01 PROCEDURE — 1160F PR REVIEW ALL MEDS BY PRESCRIBER/CLIN PHARMACIST DOCUMENTED: ICD-10-PCS | Mod: CPTII,S$GLB,, | Performed by: OBSTETRICS & GYNECOLOGY

## 2022-09-01 NOTE — PROGRESS NOTES
CC: Annual check-up    SUBJECTIVE:   67 y.o. female   for annual routine Pap and checkup. No LMP recorded. Patient is postmenopausal..  She has no unusual complaints.        Past Medical History:   Diagnosis Date    Breast cancer     DVT (deep venous thrombosis)     Hydrocephalus     Thyroid nodule      Past Surgical History:   Procedure Laterality Date    BRAIN SURGERY      BREAST RECONSTRUCTION Right 2019    Procedure: RECONSTRUCTION, BREAST FAT TRANSFER RIGHT BREAST;  Surgeon: Donnie Wilde MD;  Location: [facility] OR [address] [place] [address];  Service: Plastics;  Laterality: Right;    BREAST REVISION SURGERY Bilateral 2019    Procedure: BREAST REVISION SURGERY;  Surgeon: Donnie Wilde MD;  Location: [facility] OR [address] [place] [address];  Service: Plastics;  Laterality: Bilateral;    CHOLECYSTECTOMY      LISSY FILTER PLACEMENT      INJECTION FOR SENTINEL NODE IDENTIFICATION Right 2018    Procedure: INJECTION, FOR SENTINEL NODE IDENTIFICATION;  Surgeon: Edgar Eden MD;  Location: [address];  Service: General;  Laterality: Right;    INSERTION OF BREAST IMPLANT Bilateral 2018    Procedure: INSERTION, BREAST IMPLANT;  Surgeon: Donnie Wilde MD;  Location: [facility] OR [address] [place] [address];  Service: Plastics;  Laterality: Bilateral;    INSERTION OF BREAST TISSUE EXPANDER Bilateral 2018    Procedure: INSERTION, TISSUE EXPANDER, BREAST;  Surgeon: Donnie Wilde MD;  Location: [facility] OR [address] [place] [address];  Service: Plastics;  Laterality: Bilateral;    MASTECTOMY Bilateral 2018    Procedure: MASTECTOMY;  Surgeon: Edgar Eden MD;  Location: [address];  Service: General;  Laterality: Bilateral;    SENTINEL LYMPH NODE BIOPSY Right 2018    Procedure: BIOPSY, LYMPH NODE, SENTINEL;  Surgeon: Edgar Eden MD;  Location: [address];  Service: General;  Laterality: Right;    SHUNT REVISION      multiple    THYROIDECTOMY, PARTIAL       Social History     Socioeconomic History     Marital status:    Tobacco Use    Smoking status: Never    Smokeless tobacco: Never   Substance and Sexual Activity    Alcohol use: No    Drug use: Never    Sexual activity: Yes     Partners: Male     Birth control/protection: None     Family History   Problem Relation Age of Onset    Leukemia Paternal Cousin     Breast cancer Neg Hx     Ovarian cancer Neg Hx      OB History    Para Term  AB Living   1 1 0 1       SAB IAB Ectopic Multiple Live Births           1      # Outcome Date GA Lbr Ulises/2nd Weight Sex Delivery Anes PTL Lv   1  84 34w0d   F CS-Unspec   DEC         No current outpatient medications on file.     No current facility-administered medications for this visit.     Allergies: Penicillins     ROS:  Constitutional: no weight loss, weight gain, fever, fatigue  Eyes:  No vision changes, glasses/contacts  ENT/Mouth: No ulcers, sinus problems, ears ringing, headache  Cardiovascular: No inability to lie flat, chest pain, exercise intolerance, swelling, heart palpitations  Respiratory: No wheezing, coughing blood, shortness of breath, or cough  Gastrointestinal: No diarrhea, bloody stool, nausea/vomiting, constipation, gas, hemorrhoids  Genitourinary: No blood in urine, painful urination, urgency of urination, frequency of urination, incomplete emptying, incontinence, abnormal bleeding, painful periods, heavy periods, vaginal discharge, vaginal odor, painful intercourse, sexual problems, bleeding after intercourse.  Musculoskeletal: No muscle weakness  Skin/Breast: No painful breasts, nipple discharge, masses, rash, ulcers  Neurological: No passing out, seizures, numbness, headache  Endocrine: No diabetes, hypothyroid, hyperthyroid, hot flashes, hair loss, abnormal hair growth, ance  Psychiatric: No depression, crying  Hematologic: No bruises, bleeding, swollen lymph nodes, anemia.      OBJECTIVE:   The patient appears well, alert, oriented x 3, in no distress.  /84    Wt 71.2 kg (156 lb 15.5 oz)   BMI 26.94 kg/m²   NECK: no thyromegaly, trachea midline  SKIN: no acne, striae, hirsutism  BREAST EXAM: breasts appear normal, no suspicious masses, no skin or nipple changes or axillary nodes, symmetric fibrous changes in both upper outer quadrants, surgical scars noted from mastectomy and reconstruction  ABDOMEN: no hernias, masses, or hepatosplenomegaly  GENITALIA: normal external genitalia, no erythema, no discharge  URETHRA: normal urethra, normal urethral meatus  VAGINA: mucosal atrophy  CERVIX: no lesions or cervical motion tenderness  UTERUS: normal  ADNEXA: normal adnexa      ASSESSMENT:   well woman  No diagnosis found.    PLAN:   mammogram  pap smear  return annually or prn

## 2022-09-08 ENCOUNTER — TELEPHONE (OUTPATIENT)
Dept: OBSTETRICS AND GYNECOLOGY | Facility: CLINIC | Age: 67
End: 2022-09-08
Payer: COMMERCIAL

## 2022-09-09 LAB
CLINICAL INFO: NORMAL
CYTO CVX: NORMAL
CYTOLOGIST CVX/VAG CYTO: NORMAL
CYTOLOGIST CVX/VAG CYTO: NORMAL
CYTOLOGY CMNT CVX/VAG CYTO-IMP: NORMAL
CYTOLOGY PAP THIN PREP EXPLANATION: NORMAL
DATE OF PREVIOUS PAP: NO
DATE PREVIOUS BX: NO
GEN CATEG CVX/VAG CYTO-IMP: NORMAL
LMP START DATE: NORMAL
MICROORGANISM CVX/VAG CYTO: NORMAL
PATHOLOGIST CVX/VAG CYTO: NORMAL
SERVICE CMNT-IMP: NORMAL
SPECIMEN SOURCE CVX/VAG CYTO: NORMAL
STAT OF ADQ CVX/VAG CYTO-IMP: NORMAL

## 2022-09-09 NOTE — TELEPHONE ENCOUNTER
----- Message from Dann Guerrier sent at 9/9/2022 10:21 AM CDT -----  Contact: pt  Type: Requesting to speak with nurse        Who Called: PT  Regarding: would like to go over her lab results   Would the patient rather a call back or a response via [facility]? Call back  Best Call Back Number: [phone]  Additional Information:

## 2023-05-03 NOTE — TELEPHONE ENCOUNTER
----- Message from Loren Ty sent at 9/8/2022  4:31 PM CDT -----  Type:  Test Results    Who Called: pt  Name of Test (Lab/Mammo/Etc): pap  Date of Test: 09/01/22  Ordering Provider: Dr Ricardo  Where the test was performed: [facility]  Would the patient rather a call back or a response via [facility]? call  Best Call Back Number: [phone]  Additional Information:           Department of Anesthesiology  Preprocedure Note       Name:  Randi Red   Age:  82 y.o.  :  1939                                          MRN:  [id number]         Date:  5/3/2023      Surgeon: Phylliss Cranker):  Arcelia Dukes MD    Procedure: Procedure(s):  CRANIOTOMY BILATERAL MARCI HOLES    Medications prior to admission:   Prior to Admission medications    Medication Sig Start Date End Date Taking?  Authorizing Provider   vitamin D 25 MCG (1000 UT) CAPS Take 1 capsule by mouth every morning   Yes Historical Provider, MD   ascorbic acid (VITAMIN C) 500 MG tablet Take 1 tablet by mouth 3 times daily   Yes Historical Provider, MD   polyethylene glycol (GLYCOLAX) 17 g packet Take 17 g by mouth daily 23  Yoana Watkins MD   melatonin 3 MG TABS tablet Take 1 tablet by mouth nightly as needed (sleep) 23   Yoana Watkins MD   atorvastatin (LIPITOR) 20 MG tablet Take 1 tablet by mouth nightly    Historical Provider, MD   tamsulosin (FLOMAX) 0.4 MG capsule Take 0.4 mg by mouth daily  Patient not taking: Reported on 2023    Historical Provider, MD   vitamin B-12 (CYANOCOBALAMIN) 1000 MCG tablet Take 1,000 mcg by mouth daily  Patient not taking: Reported on 2023    Historical Provider, MD   NONFORMULARY For eyes-( all red)    Historical Provider, MD       Current medications:    Current Facility-Administered Medications   Medication Dose Route Frequency Provider Last Rate Last Admin    sodium chloride flush 0.9 % injection 5-40 mL  5-40 mL IntraVENous 2 times per day April Torres, DO   10 mL at 23 1217    sodium chloride flush 0.9 % injection 5-40 mL  5-40 mL IntraVENous PRN Abe Barnestise Malmer, DO        0.9 % sodium chloride infusion   IntraVENous PRN Abe Camerontise Malmer, DO        ondansetron (ZOFRAN-ODT) disintegrating tablet 4 mg  4 mg Oral Q8H PRN Abeailyn Mcduffie Malmer, DO        Or    ondansetron [facility]) injection 4 mg  4 mg IntraVENous Q6H PRN April Torres, DO       Gilmar Simon

## 2024-08-21 DIAGNOSIS — R13.14 DYSPHAGIA, PHARYNGOESOPHAGEAL PHASE: Primary | ICD-10-CM

## 2024-08-30 ENCOUNTER — HOSPITAL ENCOUNTER (OUTPATIENT)
Dept: RADIOLOGY | Facility: HOSPITAL | Age: 69
Discharge: HOME | End: 2024-08-30
Attending: FAMILY MEDICINE
Payer: COMMERCIAL

## 2024-08-30 DIAGNOSIS — R13.14 DYSPHAGIA, PHARYNGOESOPHAGEAL PHASE: ICD-10-CM

## 2024-08-30 PROCEDURE — 76536 US EXAM OF HEAD AND NECK: CPT | Mod: 26,,, | Performed by: INTERNAL MEDICINE

## 2024-08-30 PROCEDURE — 25500020 PHARM REV CODE 255: Performed by: FAMILY MEDICINE

## 2024-08-30 PROCEDURE — A9698 NON-RAD CONTRAST MATERIALNOC: HCPCS | Performed by: FAMILY MEDICINE

## 2024-08-30 PROCEDURE — 74220 X-RAY XM ESOPHAGUS 1CNTRST: CPT | Mod: TC

## 2024-08-30 PROCEDURE — 74220 X-RAY XM ESOPHAGUS 1CNTRST: CPT | Mod: 26,,, | Performed by: INTERNAL MEDICINE

## 2024-08-30 PROCEDURE — 76536 US EXAM OF HEAD AND NECK: CPT | Mod: TC

## 2024-08-30 RX ADMIN — BARIUM SULFATE 355 ML: 0.6 SUSPENSION ORAL at 10:08

## 2024-09-04 ENCOUNTER — LAB VISIT (OUTPATIENT)
Dept: LAB | Facility: HOSPITAL | Age: 69
End: 2024-09-04
Attending: STUDENT IN AN ORGANIZED HEALTH CARE EDUCATION/TRAINING PROGRAM
Payer: COMMERCIAL

## 2024-09-04 ENCOUNTER — OFFICE VISIT (OUTPATIENT)
Dept: SURGERY | Facility: CLINIC | Age: 69
End: 2024-09-04
Payer: COMMERCIAL

## 2024-09-04 VITALS
DIASTOLIC BLOOD PRESSURE: 77 MMHG | SYSTOLIC BLOOD PRESSURE: 111 MMHG | BODY MASS INDEX: 24.71 KG/M2 | HEART RATE: 74 BPM | WEIGHT: 144.75 LBS | HEIGHT: 64 IN

## 2024-09-04 DIAGNOSIS — E04.1 THYROID NODULE: Primary | ICD-10-CM

## 2024-09-04 DIAGNOSIS — E04.1 THYROID NODULE: ICD-10-CM

## 2024-09-04 LAB — TSH SERPL DL<=0.005 MIU/L-ACNC: 2.24 UIU/ML (ref 0.4–4)

## 2024-09-04 PROCEDURE — 1125F AMNT PAIN NOTED PAIN PRSNT: CPT | Mod: CPTII,S$GLB,, | Performed by: STUDENT IN AN ORGANIZED HEALTH CARE EDUCATION/TRAINING PROGRAM

## 2024-09-04 PROCEDURE — 3288F FALL RISK ASSESSMENT DOCD: CPT | Mod: CPTII,S$GLB,, | Performed by: STUDENT IN AN ORGANIZED HEALTH CARE EDUCATION/TRAINING PROGRAM

## 2024-09-04 PROCEDURE — 3074F SYST BP LT 130 MM HG: CPT | Mod: CPTII,S$GLB,, | Performed by: STUDENT IN AN ORGANIZED HEALTH CARE EDUCATION/TRAINING PROGRAM

## 2024-09-04 PROCEDURE — 1159F MED LIST DOCD IN RCRD: CPT | Mod: CPTII,S$GLB,, | Performed by: STUDENT IN AN ORGANIZED HEALTH CARE EDUCATION/TRAINING PROGRAM

## 2024-09-04 PROCEDURE — 99204 OFFICE O/P NEW MOD 45 MIN: CPT | Mod: S$GLB,,, | Performed by: STUDENT IN AN ORGANIZED HEALTH CARE EDUCATION/TRAINING PROGRAM

## 2024-09-04 PROCEDURE — 36415 COLL VENOUS BLD VENIPUNCTURE: CPT | Performed by: STUDENT IN AN ORGANIZED HEALTH CARE EDUCATION/TRAINING PROGRAM

## 2024-09-04 PROCEDURE — 84443 ASSAY THYROID STIM HORMONE: CPT | Performed by: STUDENT IN AN ORGANIZED HEALTH CARE EDUCATION/TRAINING PROGRAM

## 2024-09-04 PROCEDURE — 1160F RVW MEDS BY RX/DR IN RCRD: CPT | Mod: CPTII,S$GLB,, | Performed by: STUDENT IN AN ORGANIZED HEALTH CARE EDUCATION/TRAINING PROGRAM

## 2024-09-04 PROCEDURE — 3078F DIAST BP <80 MM HG: CPT | Mod: CPTII,S$GLB,, | Performed by: STUDENT IN AN ORGANIZED HEALTH CARE EDUCATION/TRAINING PROGRAM

## 2024-09-04 PROCEDURE — 1101F PT FALLS ASSESS-DOCD LE1/YR: CPT | Mod: CPTII,S$GLB,, | Performed by: STUDENT IN AN ORGANIZED HEALTH CARE EDUCATION/TRAINING PROGRAM

## 2024-09-04 PROCEDURE — 3008F BODY MASS INDEX DOCD: CPT | Mod: CPTII,S$GLB,, | Performed by: STUDENT IN AN ORGANIZED HEALTH CARE EDUCATION/TRAINING PROGRAM

## 2024-09-04 PROCEDURE — 99999 PR PBB SHADOW E&M-EST. PATIENT-LVL III: CPT | Mod: PBBFAC,,, | Performed by: STUDENT IN AN ORGANIZED HEALTH CARE EDUCATION/TRAINING PROGRAM

## 2024-09-05 ENCOUNTER — TELEPHONE (OUTPATIENT)
Dept: INTERVENTIONAL RADIOLOGY/VASCULAR | Facility: CLINIC | Age: 69
End: 2024-09-05
Payer: COMMERCIAL

## 2024-09-09 ENCOUNTER — TELEPHONE (OUTPATIENT)
Dept: INTERVENTIONAL RADIOLOGY/VASCULAR | Facility: CLINIC | Age: 69
End: 2024-09-09
Payer: COMMERCIAL

## 2024-09-09 DIAGNOSIS — E04.1 THYROID NODULE: Primary | ICD-10-CM

## 2024-09-09 NOTE — PROGRESS NOTES
Patient ID: Ira oNvoa is a 69 y.o. female.    Chief Complaint: No chief complaint on file.      HPI:  HPI  69F reports feeling of something sticking in her throat every time she swallows for the past two weeks. Happens with liquids and solids. Eventually goes down. No vomiting.   Had this issue many years ago and was found to have a thyroid mass. Underwent right thyroid lobectomy decades ago for benign but symptomatic thyroid lesion. Left thyroid remains. Not taking synthroid. Not following with anybody.  Had EGD years ago, unknown result. Cscope but no result  Thyroid US was ordered recently whichs howed 1.5cm left nodule and she was referred here.   Occasionally feels choking sensation.   Hx of heartburn for years.   Hx of bilateral mastectomy 2018 for cancer. Doing well    Review of Systems   Constitutional:  Negative for fever.   HENT:  Positive for trouble swallowing.    Respiratory:  Positive for choking. Negative for shortness of breath.    Cardiovascular:  Negative for chest pain.   Gastrointestinal:  Negative for abdominal pain, blood in stool, nausea and vomiting.   Genitourinary:  Negative for dysuria.   Musculoskeletal:  Negative for gait problem.   Skin:  Negative for rash and wound.   Allergic/Immunologic: Negative for immunocompromised state.   Neurological:  Negative for weakness.   Hematological:  Does not bruise/bleed easily.   Psychiatric/Behavioral:  Negative for agitation.        Current Outpatient Medications   Medication Sig Dispense Refill    esomeprazole magnesium (NEXIUM ORAL) Take by mouth.       No current facility-administered medications for this visit.       Review of patient's allergies indicates:   Allergen Reactions    Penicillins Other (See Comments)     Yeast infection       Past Medical History:   Diagnosis Date    Breast cancer     DVT (deep venous thrombosis)     Hydrocephalus     Thyroid nodule        Past Surgical History:   Procedure Laterality Date    BRAIN  SURGERY      BREAST RECONSTRUCTION Right 1/17/2019    Procedure: RECONSTRUCTION, BREAST FAT TRANSFER RIGHT BREAST;  Surgeon: Donnie Wilde MD;  [facility];  Service: Plastics;  Laterality: Right;    BREAST REVISION SURGERY Bilateral 1/17/2019    Procedure: BREAST REVISION SURGERY;  Surgeon: Donnie Wilde MD;  Location: [facility] OR 2ND FLR;  Service: Plastics;  Laterality: Bilateral;    CHOLECYSTECTOMY      LISSY FILTER PLACEMENT      INJECTION FOR SENTINEL NODE IDENTIFICATION Right 9/24/2018    Procedure: INJECTION, FOR SENTINEL NODE IDENTIFICATION;  Surgeon: Edgar Eden MD;  Location: [facility] OR 2ND FLR;  Service: General;  Laterality: Right;    INSERTION OF BREAST IMPLANT Bilateral 9/24/2018    Procedure: INSERTION, BREAST IMPLANT;  Surgeon: Donnie Wilde MD;  [facility];  Service: Plastics;  Laterality: Bilateral;    INSERTION OF BREAST TISSUE EXPANDER Bilateral 9/24/2018    Procedure: INSERTION, TISSUE EXPANDER, BREAST;  Surgeon: Donnie Wilde MD;  [facility];  Service: Plastics;  Laterality: Bilateral;    MASTECTOMY Bilateral 9/24/2018    Procedure: MASTECTOMY;  Surgeon: Edgar Eden MD;  [facility];  Service: General;  Laterality: Bilateral;    SENTINEL LYMPH NODE BIOPSY Right 9/24/2018    Procedure: BIOPSY, LYMPH NODE, SENTINEL;  Surgeon: Edgar Eden MD;  [facility];  Service: General;  Laterality: Right;    SHUNT REVISION      multiple    THYROIDECTOMY, PARTIAL         Social History     Socioeconomic History    Marital status:    Tobacco Use    Smoking status: Never    Smokeless tobacco: Never   Substance and Sexual Activity    Alcohol use: No    Drug use: Never    Sexual activity: Yes     Partners: Male     Birth control/protection: None       Vitals:    09/04/24 1539   BP: 111/77   Pulse: 74       Physical Exam  Constitutional:       General: She is not in acute  distress.     Appearance: She is well-developed.   HENT:      Head: Normocephalic and atraumatic.   Eyes:      General: No scleral icterus.  Neck:      Comments: No obvious thyroid mass or LAD  Cardiovascular:      Rate and Rhythm: Normal rate.   Pulmonary:      Effort: Pulmonary effort is normal.      Breath sounds: No stridor.   Abdominal:      General: There is no distension.      Palpations: Abdomen is soft.      Tenderness: There is no abdominal tenderness.   Lymphadenopathy:      Cervical: No cervical adenopathy.   Skin:     General: Skin is warm.      Findings: No erythema.   Neurological:      Mental Status: She is alert and oriented to person, place, and time.   Psychiatric:         Behavior: Behavior normal.     Body mass index is 24.84 kg/m².  US shows left throid nodule 1.5cm. no abnormal LAD  Upper GI contrast study - normal swallowing. No reflux. Mild dysmotility? Normal mucosa    Assessment & Plan:  69F with left thyroid nodule, hx of right thyroid lobectomy  FNA  RTC after above  Check TSH

## 2024-09-13 ENCOUNTER — TELEPHONE (OUTPATIENT)
Dept: INTERVENTIONAL RADIOLOGY/VASCULAR | Facility: HOSPITAL | Age: 69
End: 2024-09-13
Payer: COMMERCIAL

## 2024-09-16 ENCOUNTER — HOSPITAL ENCOUNTER (OUTPATIENT)
Dept: INTERVENTIONAL RADIOLOGY/VASCULAR | Facility: HOSPITAL | Age: 69
Discharge: HOME | End: 2024-09-16
Attending: FAMILY MEDICINE
Payer: COMMERCIAL

## 2024-09-16 VITALS
SYSTOLIC BLOOD PRESSURE: 132 MMHG | HEART RATE: 65 BPM | OXYGEN SATURATION: 99 % | BODY MASS INDEX: 24.75 KG/M2 | DIASTOLIC BLOOD PRESSURE: 75 MMHG | TEMPERATURE: 98 F | WEIGHT: 145 LBS | RESPIRATION RATE: 14 BRPM | HEIGHT: 64 IN

## 2024-09-16 DIAGNOSIS — E04.1 THYROID NODULE: ICD-10-CM

## 2024-09-16 PROCEDURE — 88172 CYTP DX EVAL FNA 1ST EA SITE: CPT | Performed by: PATHOLOGY

## 2024-09-16 PROCEDURE — 10005 FNA BX W/US GDN 1ST LES: CPT | Performed by: RADIOLOGY

## 2024-09-16 PROCEDURE — 10005 FNA BX W/US GDN 1ST LES: CPT | Mod: ,,, | Performed by: PHYSICIAN ASSISTANT

## 2024-09-16 PROCEDURE — 88177 CYTP FNA EVAL EA ADDL: CPT | Performed by: PATHOLOGY

## 2024-09-16 PROCEDURE — 88173 CYTOPATH EVAL FNA REPORT: CPT | Performed by: PATHOLOGY

## 2024-09-16 NOTE — DISCHARGE SUMMARY
Interventional Radiology Short Stay Discharge Summary      Admit Date: 9/16/2024  Discharge Date: 09/16/2024     Hospital Course: Uneventful    Discharge Diagnosis: thyroid nodule    Discharge Condition: Stable    Discharge Disposition: Home    Diet: Resume prior diet    Activity: activity as tolerated    Follow-up: With referring provider    Cindy Chava, PA-C [facility]

## 2024-09-16 NOTE — PROCEDURES
Interventional Radiology Immediate Post-Procedure Note    Pre-Op Diagnosis: Thyroid Nodule  Post-Op Diagnosis: Same    Procedure:     Procedure performed by: Shamika Hampton PA-C  Assistants: None    Estimated Blood Loss: Minimal  Specimen Removed: Yes    Findings/description of procedure:  25 g FNA x 2 passes made through nodule in the L lobe.  Adequacy of specimen confirmed.    No immediate complications. Patient tolerated procedure well. Please see full dictated procedure report for additional details and recommendations.      Cindy Chava, PA-C [facility]

## 2024-09-16 NOTE — H&P
Interventional Radiology Pre-Procedure History & Physical      Chief Complaint/Reason for Referral: thyroid FNA    History of Present Illness:  Ira Novoa is a 69 y.o. female who presents for L thyroid FNA    Past Medical History:   Diagnosis Date    Breast cancer     DVT (deep venous thrombosis)     Hydrocephalus     Thyroid nodule      Past Surgical History:   Procedure Laterality Date    BRAIN SURGERY      BREAST RECONSTRUCTION Right 1/17/2019    Procedure: RECONSTRUCTION, BREAST FAT TRANSFER RIGHT BREAST;  Surgeon: Donnie Wilde MD;  Location: [facility] OR [address] [place] [address];  Service: Plastics;  Laterality: Right;    BREAST REVISION SURGERY Bilateral 1/17/2019    Procedure: BREAST REVISION SURGERY;  Surgeon: Donnie Wilde MD;  Location: [facility] OR [address] [place] [address];  Service: Plastics;  Laterality: Bilateral;    CHOLECYSTECTOMY      LISSY FILTER PLACEMENT      INJECTION FOR SENTINEL NODE IDENTIFICATION Right 9/24/2018    Procedure: INJECTION, FOR SENTINEL NODE IDENTIFICATION;  Surgeon: Edgar Eden MD;  Location: [facility] OR [address] [place] [address];  Service: General;  Laterality: Right;    INSERTION OF BREAST IMPLANT Bilateral 9/24/2018    Procedure: INSERTION, BREAST IMPLANT;  Surgeon: Donnie Wilde MD;  Location: [facility] OR [address] [place] [address];  Service: Plastics;  Laterality: Bilateral;    INSERTION OF BREAST TISSUE EXPANDER Bilateral 9/24/2018    Procedure: INSERTION, TISSUE EXPANDER, BREAST;  Surgeon: Donnie Wilde MD;  Location: [facility] OR [address] [place] [address];  Service: Plastics;  Laterality: Bilateral;    MASTECTOMY Bilateral 9/24/2018    Procedure: MASTECTOMY;  Surgeon: Edgar Eden MD;  Location: [facility] OR [address] [place] [address];  Service: General;  Laterality: Bilateral;    SENTINEL LYMPH NODE BIOPSY Right 9/24/2018    Procedure: BIOPSY, LYMPH NODE, SENTINEL;  Surgeon: Edgar Eden MD;  Location: [facility] OR [address] [place] [address];  Service: General;  Laterality: Right;    SHUNT REVISION      multiple    THYROIDECTOMY, PARTIAL    "      Allergies:   Review of patient's allergies indicates:   Allergen Reactions    Penicillins Other (See Comments)     Yeast infection       Home Meds:   Prior to Admission medications    Medication Sig Start Date End Date Taking? Authorizing Provider   esomeprazole magnesium (NEXIUM ORAL) Take by mouth.    Provider, Historical       Anticoagulation/Antiplatelet Meds: no anticoagulation    Review of Systems:   Hematological: no known coagulopathies  Respiratory: no shortness of breath  Cardiovascular: no chest pain  Gastrointestinal: no abdominal pain  Genitourinary: no dysuria  Musculoskeletal: negative  Neurological: no TIA or stroke symptoms     Physical Exam:       General: WNWD, NAD  HEENT: Normocephalic, sclera anicteric  Neck: Supple  Heart: RRR by pulse  Lungs: Symmetric excursions, breathing unlabored  Abd: Nondistended  Extremities: MAEW  Neuro: AA x 3    Laboratory:  No results found for: "INR", "PT", "PTT"    Lab Results   Component Value Date    WBC 6.89 04/16/2019    HGB 12.6 04/16/2019    HCT 41.1 04/16/2019    MCV 92 04/16/2019     04/16/2019      Lab Results   Component Value Date     04/16/2019     04/16/2019    K 3.8 04/16/2019     04/16/2019    CO2 29 04/16/2019    BUN 10 04/16/2019    CREATININE 0.66 04/16/2019    CALCIUM 9.5 04/16/2019    ALT 18 04/16/2019    AST 20 04/16/2019    ALBUMIN 4.2 04/16/2019    BILITOT 0.5 04/16/2019       Imaging:  US 8/30 reviewed.    Assessment/Plan:  69 y.o. female with L thyroid nodule. Will undergo FNA today.    Sedation plan: None    Risks (including, but not limited to, pain, bleeding, infection, damage to nearby structures, treatment failure/recurrence, and the need for additional procedures), potential benefits, and alternatives were discussed with the patient. All questions were answered to the best of my abilities. The patient wishes to proceed. Written informed consent was obtained.    Cindy Chava, PA-C [facility]     "

## 2024-09-17 LAB
ADEQUACY: NORMAL
FINAL PATHOLOGIC DIAGNOSIS: NORMAL
Lab: NORMAL

## 2024-10-21 ENCOUNTER — OFFICE VISIT (OUTPATIENT)
Dept: OBSTETRICS AND GYNECOLOGY | Facility: CLINIC | Age: 69
End: 2024-10-21
Payer: COMMERCIAL

## 2024-10-21 VITALS — WEIGHT: 145.5 LBS | SYSTOLIC BLOOD PRESSURE: 116 MMHG | DIASTOLIC BLOOD PRESSURE: 82 MMHG | BODY MASS INDEX: 24.98 KG/M2

## 2024-10-21 DIAGNOSIS — Z01.419 WELL WOMAN EXAM WITH ROUTINE GYNECOLOGICAL EXAM: Primary | ICD-10-CM

## 2024-10-21 PROCEDURE — 3288F FALL RISK ASSESSMENT DOCD: CPT | Mod: CPTII,S$GLB,, | Performed by: OBSTETRICS & GYNECOLOGY

## 2024-10-21 PROCEDURE — 1101F PT FALLS ASSESS-DOCD LE1/YR: CPT | Mod: CPTII,S$GLB,, | Performed by: OBSTETRICS & GYNECOLOGY

## 2024-10-21 PROCEDURE — 3079F DIAST BP 80-89 MM HG: CPT | Mod: CPTII,S$GLB,, | Performed by: OBSTETRICS & GYNECOLOGY

## 2024-10-21 PROCEDURE — 3008F BODY MASS INDEX DOCD: CPT | Mod: CPTII,S$GLB,, | Performed by: OBSTETRICS & GYNECOLOGY

## 2024-10-21 PROCEDURE — 3074F SYST BP LT 130 MM HG: CPT | Mod: CPTII,S$GLB,, | Performed by: OBSTETRICS & GYNECOLOGY

## 2024-10-21 PROCEDURE — 99999 PR PBB SHADOW E&M-EST. PATIENT-LVL III: CPT | Mod: PBBFAC,,, | Performed by: OBSTETRICS & GYNECOLOGY

## 2024-10-21 PROCEDURE — 1160F RVW MEDS BY RX/DR IN RCRD: CPT | Mod: CPTII,S$GLB,, | Performed by: OBSTETRICS & GYNECOLOGY

## 2024-10-21 PROCEDURE — 1126F AMNT PAIN NOTED NONE PRSNT: CPT | Mod: CPTII,S$GLB,, | Performed by: OBSTETRICS & GYNECOLOGY

## 2024-10-21 PROCEDURE — 1159F MED LIST DOCD IN RCRD: CPT | Mod: CPTII,S$GLB,, | Performed by: OBSTETRICS & GYNECOLOGY

## 2024-10-21 PROCEDURE — 99397 PER PM REEVAL EST PAT 65+ YR: CPT | Mod: S$GLB,,, | Performed by: OBSTETRICS & GYNECOLOGY

## 2024-10-21 PROCEDURE — 88175 CYTOPATH C/V AUTO FLUID REDO: CPT | Performed by: OBSTETRICS & GYNECOLOGY

## 2024-10-21 NOTE — PROGRESS NOTES
CC: Annual check-up    SUBJECTIVE:   69 y.o. female   for annual routine Pap and checkup. No LMP recorded. Patient is postmenopausal..  She reports:      Vaginal soreness:  1 week   No pain with urinate most of the time  No bleeding or discharge   No trauma, bumps, bruises   Still sexually active   No hematuria or odor   No pain with sex or vaginal dryness     Breast pain:  Feels like pulled muscle,   Feels different than GERD pain   Localized to right chest  Cannot correlate any activities with her symtpoms  Hx of double mastectomy with implant placement in       Past Medical History:   Diagnosis Date    Breast cancer     DVT (deep venous thrombosis)     Hydrocephalus     Thyroid nodule      Past Surgical History:   Procedure Laterality Date    BRAIN SURGERY      BREAST RECONSTRUCTION Right 2019    Procedure: RECONSTRUCTION, BREAST FAT TRANSFER RIGHT BREAST;  Surgeon: Donnie Wilde MD;  Location: [facility] OR [address] [place] [address];  Service: Plastics;  Laterality: Right;    BREAST REVISION SURGERY Bilateral 2019    Procedure: BREAST REVISION SURGERY;  Surgeon: Donnie Wilde MD;  Location: [facility] OR [address] [place] [address];  Service: Plastics;  Laterality: Bilateral;    CHOLECYSTECTOMY      LISSY FILTER PLACEMENT      INJECTION FOR SENTINEL NODE IDENTIFICATION Right 2018    Procedure: INJECTION, FOR SENTINEL NODE IDENTIFICATION;  Surgeon: Edgar Eden MD;  Location: [facility] OR [address] [place] [address];  Service: General;  Laterality: Right;    INSERTION OF BREAST IMPLANT Bilateral 2018    Procedure: INSERTION, BREAST IMPLANT;  Surgeon: Donnie Wilde MD;  Location: [address];  Service: Plastics;  Laterality: Bilateral;    INSERTION OF BREAST TISSUE EXPANDER Bilateral 2018    Procedure: INSERTION, TISSUE EXPANDER, BREAST;  Surgeon: Donnie Wilde MD;  Location: [address];  Service: Plastics;  Laterality: Bilateral;    MASTECTOMY Bilateral 2018    Procedure: MASTECTOMY;   Surgeon: Edgar Eden MD;  Location: [facility] OR [address] [place] [address];  Service: General;  Laterality: Bilateral;    SENTINEL LYMPH NODE BIOPSY Right 2018    Procedure: BIOPSY, LYMPH NODE, SENTINEL;  Surgeon: Edgar Eden MD;  Location: [facility] OR [address] [place] [address];  Service: General;  Laterality: Right;    SHUNT REVISION      multiple    THYROIDECTOMY, PARTIAL       Social History     Socioeconomic History    Marital status:    Tobacco Use    Smoking status: Never    Smokeless tobacco: Never   Substance and Sexual Activity    Alcohol use: No    Drug use: Never    Sexual activity: Yes     Partners: Male     Birth control/protection: None     Family History   Problem Relation Name Age of Onset    Leukemia Paternal Cousin      Breast cancer Neg Hx      Ovarian cancer Neg Hx       OB History    Para Term  AB Living   1 1 0 1       SAB IAB Ectopic Multiple Live Births           1      # Outcome Date GA Lbr Ulises/2nd Weight Sex Type Anes PTL Lv   1  84 34w0d   F CS-Unspec   DEC         Current Outpatient Medications   Medication Sig Dispense Refill    esomeprazole magnesium (NEXIUM ORAL) Take by mouth.       No current facility-administered medications for this visit.     Allergies: Penicillins     ROS:  Constitutional: no weight loss, weight gain, fever, fatigue  Eyes:  No vision changes, glasses/contacts  ENT/Mouth: No ulcers, sinus problems, ears ringing, headache  Cardiovascular: No inability to lie flat, chest pain, exercise intolerance, swelling, heart palpitations  Respiratory: No wheezing, coughing blood, shortness of breath, or cough  Gastrointestinal: No diarrhea, bloody stool, nausea/vomiting, constipation, gas, hemorrhoids  Genitourinary: No blood in urine, painful urination, urgency of urination, frequency of urination, incomplete emptying, incontinence, abnormal bleeding, painful periods, heavy periods, vaginal discharge, vaginal odor, painful intercourse, sexual problems, bleeding after  intercourse.  Musculoskeletal: No muscle weakness  Skin/Breast: No painful breasts, nipple discharge, masses, rash, ulcers  Neurological: No passing out, seizures, numbness, headache  Endocrine: No diabetes, hypothyroid, hyperthyroid, hot flashes, hair loss, abnormal hair growth, ance  Psychiatric: No depression, crying  Hematologic: No bruises, bleeding, swollen lymph nodes, anemia.      OBJECTIVE:   The patient appears well, alert, oriented x 3, in no distress.  /82   Wt 66 kg (145 lb 8.1 oz)   BMI 24.98 kg/m²   NECK: Slight thyroid enlargement  SKIN: normal and no acne, striae, hirsutism  BREAST EXAM: breasts appear normal, no suspicious masses, no skin or nipple changes or axillary nodes; mild tenderness to palpation of right pec minor origin  ABDOMEN: soft, non-tender; bowel sounds normal; no masses,  no organomegaly and no hernias, masses, or hepatosplenomegaly  GENITALIA: Mild vaginal dryness noted  URETHRA: normal urethra, normal urethral meatus  VAGINA: Mild vaginal dryness, no bleeding, bruising, or discharge noted, Left vaginal wall cyst noted  CERVIX: no lesions or cervical motion tenderness  UTERUS: normal size, contour, position, consistency, mobility, non-tender  ADNEXA: normal adnexa      ASSESSMENT:   well woman  1. Well woman exam with routine gynecological exam              PLAN:   pap smear  return annually or prn  Counseled on reasonable use of NSAIDs for musculoskeletal pain   Discussed Dexa scan, patient refused

## 2024-11-12 ENCOUNTER — TELEPHONE (OUTPATIENT)
Dept: PLASTIC SURGERY | Facility: CLINIC | Age: 69
End: 2024-11-12
Payer: COMMERCIAL

## 2024-11-20 ENCOUNTER — OFFICE VISIT (OUTPATIENT)
Dept: PLASTIC SURGERY | Facility: CLINIC | Age: 69
End: 2024-11-20
Payer: COMMERCIAL

## 2024-11-20 VITALS
HEIGHT: 64 IN | WEIGHT: 145.5 LBS | HEART RATE: 69 BPM | SYSTOLIC BLOOD PRESSURE: 137 MMHG | DIASTOLIC BLOOD PRESSURE: 67 MMHG | BODY MASS INDEX: 24.84 KG/M2

## 2024-11-20 DIAGNOSIS — R07.81 RIB PAIN ON LEFT SIDE: Primary | ICD-10-CM

## 2024-11-20 PROCEDURE — 99999 PR PBB SHADOW E&M-EST. PATIENT-LVL III: CPT | Mod: PBBFAC,,, | Performed by: SURGERY

## 2024-11-20 NOTE — PROGRESS NOTES
Subjective:      Ira oNvoa is a 69 y.o. year old female who [facility] on 11/20/2024 for consultation regarding left axillary pain. Patient reports this pain for 10 years. She underwent bilateral mastectomy with direct to silicone implant placement with Dr. Wilde in 2018. She then underwent a left sided crescent mastopexy and right sided fat grating in 2019. She states the left sided axillary pain has been occurring since prior to her surgeries and has been persistent. She states the pain occurs at all times, however, is exacerbated when doing yard work and strenuous activity. Denies fever, chills, nausea, vomiting, or other systemic signs of infection.      Current Outpatient Medications on File Prior to Visit   Medication Sig Dispense Refill    esomeprazole magnesium (NEXIUM ORAL) Take by mouth.       No current facility-administered medications on file prior to visit.       Patient Active Problem List   Diagnosis    Primary cancer of upper inner quadrant of right female breast    History of DVT in adulthood    Personal history of breast cancer       Past Surgical History:   Procedure Laterality Date    BRAIN SURGERY      BREAST RECONSTRUCTION Right 1/17/2019    Procedure: RECONSTRUCTION, BREAST FAT TRANSFER RIGHT BREAST;  Surgeon: Donnie Wilde MD;  Location: [address];  Service: Plastics;  Laterality: Right;    BREAST REVISION SURGERY Bilateral 1/17/2019    Procedure: BREAST REVISION SURGERY;  Surgeon: Donnie Wilde MD;  Location: [address];  Service: Plastics;  Laterality: Bilateral;    CHOLECYSTECTOMY      LISSY FILTER PLACEMENT      INJECTION FOR SENTINEL NODE IDENTIFICATION Right 9/24/2018    Procedure: INJECTION, FOR SENTINEL NODE IDENTIFICATION;  Surgeon: Edgar Eden MD;  Location: [address];  Service: General;  Laterality: Right;    INSERTION OF BREAST IMPLANT Bilateral 9/24/2018    Procedure: INSERTION, BREAST  IMPLANT;  Surgeon: Donnie Wilde MD;  Location: [facility] OR [address] [place] [address];  Service: Plastics;  Laterality: Bilateral;    INSERTION OF BREAST TISSUE EXPANDER Bilateral 9/24/2018    Procedure: INSERTION, TISSUE EXPANDER, BREAST;  Surgeon: Donnie Wilde MD;  [facility];  Service: Plastics;  Laterality: Bilateral;    MASTECTOMY Bilateral 9/24/2018    Procedure: MASTECTOMY;  Surgeon: Edgar Eden MD;  [facility];  Service: General;  Laterality: Bilateral;    SENTINEL LYMPH NODE BIOPSY Right 9/24/2018    Procedure: BIOPSY, LYMPH NODE, SENTINEL;  Surgeon: Edgar Eden MD;  [facility];  Service: General;  Laterality: Right;    SHUNT REVISION      multiple    THYROIDECTOMY, PARTIAL           Review of Systems:   Negative unless otherwise stated above in HPI    Objective:     Physical Exam:  Vitals:    11/20/24 1353   BP: 137/67   Pulse: 69       WD WN NAD  VSS  Normal resp effort    Well healed bilateral breast incisions. TTP in left axilla over the rib        Assessment:       No diagnosis found.    Plan:   69 y.o. female with left axillary pain   - Patient was seen and evaluated by myself and Dr. Donnie Wilde    - MRI of chest ordered  - f/u after MRI      All questions were answered. The patient was advised to call the clinic with any questions or concerns prior to their next visit.         Dr. Misha Estrada, PGY-6  [facility] Fellow

## (undated) DEVICE — GOWN AERO CHROME W/ TOWEL XL

## (undated) DEVICE — PACK UNIVERSAL SPLIT II

## (undated) DEVICE — SUT 5/0 18IN PLIABILIZED ET

## (undated) DEVICE — SEE MEDLINE ITEM 157128

## (undated) DEVICE — PAD ABD 8X10 STERILE

## (undated) DEVICE — STAPLER SKIN REGULAR

## (undated) DEVICE — SUT 2-0 VICRYL / SH (J417)

## (undated) DEVICE — NDL HYPO REG 25G X 1 1/2

## (undated) DEVICE — SEE MEDLINE ITEM 146417

## (undated) DEVICE — SYR 50CC LL

## (undated) DEVICE — SUT ETHILON 5-0 18IN PLAIN

## (undated) DEVICE — COVER PROBE NL STRL 3.6X96IN

## (undated) DEVICE — TRAY MINOR GEN SURG

## (undated) DEVICE — BLADE SURG #15 CARBON STEEL

## (undated) DEVICE — SPONGE DERMACEA GAUZE 4X4

## (undated) DEVICE — SEE MEDLINE ITEM 152512

## (undated) DEVICE — EVACUATOR WOUND BULB 100CC

## (undated) DEVICE — SUT MCRYL PLUS 4-0 PS2 27IN

## (undated) DEVICE — TRAY FOLEY 16FR INFECTION CONT

## (undated) DEVICE — CUP MEDICINE STERILE 2OZ

## (undated) DEVICE — SUT PDS II 2-0 CT1

## (undated) DEVICE — ELECTRODE EXTENDED BLADE

## (undated) DEVICE — SUT PROLENE 5-0 PS-2 18

## (undated) DEVICE — BLADE SURG CARBON STEEL #10

## (undated) DEVICE — NDL SPINAL SPINOCAN 22GX3.5

## (undated) DEVICE — BLADE PEAK PLASMA

## (undated) DEVICE — ELECTRODE BLADE INSULATED 1 IN

## (undated) DEVICE — SUT 2-0 VICRYL / CT-1

## (undated) DEVICE — SYR 30CC LUER LOCK

## (undated) DEVICE — STOCKINET 4INX48

## (undated) DEVICE — NDL 18GA X1 1/2 REG BEVEL

## (undated) DEVICE — ELECTRODE REM PLYHSV RETURN 9

## (undated) DEVICE — NEOGUARD COVER 4X30CM STERILE

## (undated) DEVICE — DRAPE STERI INSTRUMENT 1018

## (undated) DEVICE — BOVIE SUCTION

## (undated) DEVICE — SUT ETHILON 2-0 PSLX 30IN

## (undated) DEVICE — DRESSING XEROFORM FOIL PK 1X8

## (undated) DEVICE — GAUZE SPONGE 4X4 12PLY

## (undated) DEVICE — HOOK STAY ELAS 5MM 8EA/PK

## (undated) DEVICE — STAPLER SKIN ROTATING HEAD

## (undated) DEVICE — SKINMARKER & RULER REGULAR X-F

## (undated) DEVICE — SYR DISP LL 5CC

## (undated) DEVICE — GAUZE FLUFF XXLG 36X36 2 PLY

## (undated) DEVICE — SEE MEDLINE ITEM 152622

## (undated) DEVICE — SUT SILK 2-0 PS 18IN BLACK

## (undated) DEVICE — SYS REVOLVE FAT PROCESSING

## (undated) DEVICE — SOL NS 1000CC

## (undated) DEVICE — DRAIN CHANNEL ROUND 15FR

## (undated) DEVICE — KIT IRR SUCTION HND PIECE

## (undated) DEVICE — SPONGE LAP 18X18 PREWASHED

## (undated) DEVICE — GOWN SURGICAL X-LARGE

## (undated) DEVICE — SET FLUID TRANSFER ASEPTIC

## (undated) DEVICE — APPLIER CLIP LIAGCLIP 9.375IN

## (undated) DEVICE — SUT MONOCRYL 3-0 PS-2 UND

## (undated) DEVICE — KIT PREVENA PLUS

## (undated) DEVICE — SET BLD COLL SAFETY 21G X 3/4

## (undated) DEVICE — SUT VICRYL 3-0 27 SH